# Patient Record
Sex: FEMALE | Race: WHITE | NOT HISPANIC OR LATINO | Employment: OTHER | URBAN - METROPOLITAN AREA
[De-identification: names, ages, dates, MRNs, and addresses within clinical notes are randomized per-mention and may not be internally consistent; named-entity substitution may affect disease eponyms.]

---

## 2017-01-27 ENCOUNTER — TELEPHONE (OUTPATIENT)
Dept: INTERNAL MEDICINE | Facility: CLINIC | Age: 72
End: 2017-01-27

## 2017-01-27 DIAGNOSIS — E78.5 HYPERLIPIDEMIA, UNSPECIFIED HYPERLIPIDEMIA TYPE: ICD-10-CM

## 2017-01-27 DIAGNOSIS — M81.0 OSTEOPOROSIS: Primary | ICD-10-CM

## 2017-01-27 NOTE — TELEPHONE ENCOUNTER
----- Message from Zaida Nathan sent at 1/27/2017  1:18 PM CST -----  Contact: Self  Doctor appointment and lab have been scheduled.  Please link lab orders to the lab appointment.  Date of doctor appointment:  4/6  Physical or EP:  Epp  Date of lab appointment:  3/28  Comments:

## 2017-03-30 ENCOUNTER — LAB VISIT (OUTPATIENT)
Dept: LAB | Facility: HOSPITAL | Age: 72
End: 2017-03-30
Attending: INTERNAL MEDICINE
Payer: MEDICARE

## 2017-03-30 DIAGNOSIS — M81.0 OSTEOPOROSIS: ICD-10-CM

## 2017-03-30 DIAGNOSIS — E78.5 HYPERLIPIDEMIA, UNSPECIFIED HYPERLIPIDEMIA TYPE: ICD-10-CM

## 2017-03-30 LAB
ALBUMIN SERPL BCP-MCNC: 3.6 G/DL
ALP SERPL-CCNC: 76 U/L
ALT SERPL W/O P-5'-P-CCNC: 18 U/L
ANION GAP SERPL CALC-SCNC: 7 MMOL/L
AST SERPL-CCNC: 22 U/L
BASOPHILS # BLD AUTO: 0.04 K/UL
BASOPHILS NFR BLD: 0.6 %
BILIRUB SERPL-MCNC: 0.6 MG/DL
BUN SERPL-MCNC: 13 MG/DL
CALCIUM SERPL-MCNC: 9.1 MG/DL
CHLORIDE SERPL-SCNC: 108 MMOL/L
CHOLEST/HDLC SERPL: 3 {RATIO}
CO2 SERPL-SCNC: 29 MMOL/L
CREAT SERPL-MCNC: 0.8 MG/DL
DIFFERENTIAL METHOD: ABNORMAL
EOSINOPHIL # BLD AUTO: 0.2 K/UL
EOSINOPHIL NFR BLD: 2.8 %
ERYTHROCYTE [DISTWIDTH] IN BLOOD BY AUTOMATED COUNT: 13.2 %
EST. GFR  (AFRICAN AMERICAN): >60 ML/MIN/1.73 M^2
EST. GFR  (NON AFRICAN AMERICAN): >60 ML/MIN/1.73 M^2
GLUCOSE SERPL-MCNC: 92 MG/DL
HCT VFR BLD AUTO: 44.1 %
HDL/CHOLESTEROL RATIO: 33.8 %
HDLC SERPL-MCNC: 198 MG/DL
HDLC SERPL-MCNC: 67 MG/DL
HGB BLD-MCNC: 14.8 G/DL
LDLC SERPL CALC-MCNC: 122.2 MG/DL
LYMPHOCYTES # BLD AUTO: 2.5 K/UL
LYMPHOCYTES NFR BLD: 36.6 %
MCH RBC QN AUTO: 31.1 PG
MCHC RBC AUTO-ENTMCNC: 33.6 %
MCV RBC AUTO: 93 FL
MONOCYTES # BLD AUTO: 0.4 K/UL
MONOCYTES NFR BLD: 6.4 %
NEUTROPHILS # BLD AUTO: 3.6 K/UL
NEUTROPHILS NFR BLD: 53.5 %
NONHDLC SERPL-MCNC: 131 MG/DL
PLATELET # BLD AUTO: 230 K/UL
PMV BLD AUTO: 11.5 FL
POTASSIUM SERPL-SCNC: 3.9 MMOL/L
PROT SERPL-MCNC: 6.7 G/DL
RBC # BLD AUTO: 4.76 M/UL
SODIUM SERPL-SCNC: 144 MMOL/L
TRIGL SERPL-MCNC: 44 MG/DL
TSH SERPL DL<=0.005 MIU/L-ACNC: 0.97 UIU/ML
WBC # BLD AUTO: 6.77 K/UL

## 2017-03-30 PROCEDURE — 84443 ASSAY THYROID STIM HORMONE: CPT

## 2017-03-30 PROCEDURE — 80061 LIPID PANEL: CPT

## 2017-03-30 PROCEDURE — 85025 COMPLETE CBC W/AUTO DIFF WBC: CPT

## 2017-03-30 PROCEDURE — 36415 COLL VENOUS BLD VENIPUNCTURE: CPT | Mod: PO

## 2017-03-30 PROCEDURE — 80053 COMPREHEN METABOLIC PANEL: CPT

## 2017-04-06 ENCOUNTER — OFFICE VISIT (OUTPATIENT)
Dept: INTERNAL MEDICINE | Facility: CLINIC | Age: 72
End: 2017-04-06
Payer: MEDICARE

## 2017-04-06 VITALS
DIASTOLIC BLOOD PRESSURE: 72 MMHG | HEIGHT: 64 IN | HEART RATE: 76 BPM | SYSTOLIC BLOOD PRESSURE: 110 MMHG | TEMPERATURE: 98 F | BODY MASS INDEX: 21.38 KG/M2 | WEIGHT: 125.25 LBS | RESPIRATION RATE: 16 BRPM

## 2017-04-06 DIAGNOSIS — R82.90 ABNORMAL URINE: ICD-10-CM

## 2017-04-06 DIAGNOSIS — Z00.00 ANNUAL PHYSICAL EXAM: Primary | ICD-10-CM

## 2017-04-06 DIAGNOSIS — M85.89 OSTEOPENIA OF MULTIPLE SITES: ICD-10-CM

## 2017-04-06 DIAGNOSIS — R25.2 MUSCLE CRAMPS: ICD-10-CM

## 2017-04-06 DIAGNOSIS — Z12.31 VISIT FOR SCREENING MAMMOGRAM: ICD-10-CM

## 2017-04-06 LAB
AMORPH CRY UR QL COMP ASSIST: ABNORMAL
BACTERIA #/AREA URNS AUTO: ABNORMAL /HPF
BILIRUB UR QL STRIP: NEGATIVE
CLARITY UR REFRACT.AUTO: ABNORMAL
COLOR UR AUTO: YELLOW
GLUCOSE UR QL STRIP: NEGATIVE
HGB UR QL STRIP: NEGATIVE
KETONES UR QL STRIP: NEGATIVE
LEUKOCYTE ESTERASE UR QL STRIP: NEGATIVE
MICROSCOPIC COMMENT: ABNORMAL
NITRITE UR QL STRIP: NEGATIVE
PH UR STRIP: 8 [PH] (ref 5–8)
PROT UR QL STRIP: NEGATIVE
SP GR UR STRIP: 1.01 (ref 1–1.03)
SQUAMOUS #/AREA URNS AUTO: 0 /HPF
URN SPEC COLLECT METH UR: ABNORMAL
UROBILINOGEN UR STRIP-ACNC: NEGATIVE EU/DL
WBC #/AREA URNS AUTO: 2 /HPF (ref 0–5)

## 2017-04-06 PROCEDURE — 99999 PR PBB SHADOW E&M-EST. PATIENT-LVL IV: CPT | Mod: PBBFAC,,, | Performed by: INTERNAL MEDICINE

## 2017-04-06 PROCEDURE — 81001 URINALYSIS AUTO W/SCOPE: CPT

## 2017-04-06 PROCEDURE — 99214 OFFICE O/P EST MOD 30 MIN: CPT | Mod: S$PBB,,, | Performed by: INTERNAL MEDICINE

## 2017-04-06 PROCEDURE — 99214 OFFICE O/P EST MOD 30 MIN: CPT | Mod: PBBFAC,PO | Performed by: INTERNAL MEDICINE

## 2017-04-06 PROCEDURE — 87086 URINE CULTURE/COLONY COUNT: CPT

## 2017-04-06 RX ORDER — RISEDRONATE SODIUM 150 MG/1
150 TABLET, FILM COATED ORAL
Qty: 3 TABLET | Refills: 3 | Status: SHIPPED | OUTPATIENT
Start: 2017-04-06 | End: 2017-04-07

## 2017-04-06 NOTE — MR AVS SNAPSHOT
[place] - [facility] Medicine   [facility]  [place] [address]  Phone: [phone]  Fax: [phone]                  Trista Lozano   2017 9:00 AM   Office Visit    Description:  Female : 1945   Provider:  Gabriela Gonzales DO   Department:  [place] [facility]           Reason for Visit     Annual Exam           Diagnoses this Visit        Comments    Annual physical exam    -  Primary     Osteopenia of multiple sites         Visit for screening mammogram         Abnormal urine         Muscle cramps                To Do List           Future Appointments        Provider Department Dept Phone    4/10/2017 9:40 AM [facility] [phone]      Goals (5 Years of Data)     None      Follow-Up and Disposition     Return in about 1 year (around 2018).       These Medications        Disp Refills Start End    risedronate (ACTONEL) 150 MG Tab 3 tablet 3 2017    Take 1 tablet (150 mg total) by mouth every 30 days. - Oral    Pharmacy: [facility]/pharmacy #[id number] - [place] - [address] Ph #: [phone]       zoster vaccine live, PF, (ZOSTAVAX) 19,400 unit/0.65 mL injection 1 vial 0 2017    Inject 19,400 Units into the skin once. Zostavax x 1  v70.0 - Subcutaneous    Pharmacy: [facility]/pharmacy #[id number] - [place] - [address] Ph #: [phone]         [facility]     [facility] Care Line -  Assistance  Unless otherwise directed by your provider, please contact [facility], our nurse care line that is available for  assistance.     Registered nurses in the [facility] provide: appointment scheduling, clinical advisement, health education, and other advisory services.  Call: [phone] (toll free)               Medications           Message regarding Medications     Verify the changes and/or additions to your medication regime listed below are the same as  "discussed with your clinician today.  If any of these changes or additions are incorrect, please notify your healthcare provider.        START taking these NEW medications        Refills    risedronate (ACTONEL) 150 MG Tab 3    Sig: Take 1 tablet (150 mg total) by mouth every 30 days.    Class: Normal    Route: Oral    zoster vaccine live, PF, (ZOSTAVAX) 19,400 unit/0.65 mL injection 0    Sig: Inject 19,400 Units into the skin once. Zostavax x 1  v70.0    Class: Print    Route: Subcutaneous      STOP taking these medications     alendronate (FOSAMAX) 70 MG tablet Take 1 tablet once weekly in the morning with a full glass of water on an empty stomach. Do not consume food or lie down for at least 30 minutes afters.           Verify that the below list of medications is an accurate representation of the medications you are currently taking.  If none reported, the list may be blank. If incorrect, please contact your healthcare provider. Carry this list with you in case of emergency.           Current Medications     FLUVIRIN [id number] 45 mcg (15 mcg x 3)/0.5 mL Susp     risedronate (ACTONEL) 150 MG Tab Take 1 tablet (150 mg total) by mouth every 30 days.    zoster vaccine live, PF, (ZOSTAVAX) 19,400 unit/0.65 mL injection Inject 19,400 Units into the skin once. Zostavax x 1  v70.0           Clinical Reference Information           Your Vitals Were     BP Pulse Temp Resp Height Weight    110/72 (BP Location: Left arm, Patient Position: Sitting, BP Method: Manual) 76 98.2 °F (36.8 °C) (Oral) 16 5' 4" (1.626 m) 56.8 kg (125 lb 3.5 oz)    BMI                21.49 kg/m2          Blood Pressure          Most Recent Value    BP  110/72      Allergies as of 4/6/2017     No Known Allergies      Immunizations Administered on Date of Encounter - 4/6/2017     None      Orders Placed During Today's Visit      Normal Orders This Visit    Urinalysis     Urine culture     Future Labs/Procedures Expected by Expires    Mammo Digital " Screening Bilat with CAD  4/6/2017 6/6/2018      [facility] Sign-Up     Activating your [facility] account is as easy as 1-2-3!     1) Visit [email or web address], select Sign Up Now, enter this activation code and your date of birth, then select Next.  Activation code not generated  Current Patient Portal Status: Account disabled      2) Create a username and password to use when you visit [facility] in the future and select a security question in case you lose your password and select Next.    3) Enter your e-mail address and click Sign Up!    Additional Information  If you have questions, please e-mail [email or web address] or call [phone] to talk to our [facility] staff. Remember, [facility] is NOT to be used for urgent needs. For medical emergencies, dial 911.         Language Assistance Services     ATTENTION: Language assistance services are available, free of charge. Please call [phone].      ATENCIÓN: Si habla lashay, tiene a pritchard disposición servicios gratuitos de asistencia lingüística. Llame al [phone].     CHÚ Ý: N?u b?n nói Ti?ng Vi?t, có các d?ch v? h? tr? ngôn ng? mi?n phí dành cho b?n. G?i s? [phone].         [place] [facility] complies with applicable Federal civil rights laws and does not discriminate on the basis of race, color, national origin, age, disability, or sex.

## 2017-04-06 NOTE — MEDICAL/APP STUDENT
Subjective:       Patient ID: Trista Lozano is a 71 y.o. female.    Chief Complaint: Annual Exam  Ms. Lozano 71 y.o. female here for annual exam. She has 3 concerns. She has noticed recent leg cramps that occur at night when she stretches, she mostly is able to massage them out but occasionally has to walk to relieve them. She admits to not being well hydrated. She has recently noticed a burning sensation in her chest. She is unable to identified any causes, foods, beverages, times of day that would produce this and suspects it may be heartburn. She is on alendronate, but would like to something once monthly that doesn't disrupt her routine as frequently.     Cholesterol: Total Cholesterol, TGs, HDL, and LDL WNL  Vaccines: Influenza; Tetanus Due 2025; Pneumovax 2014; [id number]; Zoster Due  Sexual Screening: Active   STD screening: No concerns for STDs  Eye exam: up to date  Mammogram: Due  Gyn exam: Follows with Dr. Menon  Colonoscopy: Due 2021  DEXA: Due 2018  A1c: Normal     Exercise: Active  Diet: Does not watch diet    Risk factors    Past Medical History:   Diagnosis Date    Menarche     Age of onset 12     Past Surgical History:   Procedure Laterality Date    COLONOSCOPY N/A 4/20/2016    Procedure: COLONOSCOPY;  Surgeon: Ellis Fry MD;  Location: [address];  Service: Endoscopy;  Laterality: N/A;  Pt request Prepopik/covered by insurance/Moviprep is not    NO PAST SURGERIES       Social History     Social History    Marital status:      Spouse name: N/A    Number of children: N/A    Years of education: N/A     Occupational History    Not on file.     Social History Main Topics    Smoking status: Never Smoker    Smokeless tobacco: Never Used    Alcohol use Yes      Comment: Socially/ few drinks (2-3) every week    Drug use: No    Sexual activity: Yes     Birth control/ protection: Post-menopausal     Other Topics Concern    Not on file     Social History Narrative     Review  of patient's allergies indicates:  No Known Allergies  Ms. Lozano had no medications administered during this visit.      HPI  Review of Systems   Constitutional: Negative for chills, fatigue and unexpected weight change.   HENT: Positive for postnasal drip. Negative for congestion and sore throat.    Eyes: Negative for visual disturbance.   Respiratory: Negative for cough, shortness of breath and wheezing.    Cardiovascular: Negative for chest pain, palpitations and leg swelling.   Gastrointestinal: Negative for abdominal pain, constipation, diarrhea and nausea.   Endocrine: Negative for cold intolerance and polyuria.   Genitourinary: Negative for dysuria, frequency, hematuria and urgency.   Musculoskeletal: Positive for myalgias. Negative for arthralgias.   Skin: Negative for rash.   Neurological: Negative for dizziness, syncope, weakness and numbness.   Hematological: Negative for adenopathy.       Objective:      Physical Exam   Constitutional: She is oriented to person, place, and time. She appears well-developed and well-nourished.   HENT:   Head: Normocephalic and atraumatic.   Eyes: Pupils are equal, round, and reactive to light.   Neck: Normal range of motion. Neck supple. No thyromegaly present.   Cardiovascular: Normal rate, regular rhythm, normal heart sounds and intact distal pulses.    Pulmonary/Chest: Effort normal and breath sounds normal.   Abdominal: Soft. Bowel sounds are normal. She exhibits no distension. There is no tenderness.   Musculoskeletal: She exhibits no edema.   Neurological: She is alert and oriented to person, place, and time.   Skin: Skin is warm and dry.   Psychiatric: She has a normal mood and affect.       Assessment:       1. Annual physical exam    2. Osteopenia of multiple sites    3. Visit for screening mammogram    4. Abnormal urine        Plan:       1. Annual  -Reviewed labs  -Vaccines--> prescription for Shingles  -Mammogram to be Scheduled (Due)    2. Osteopenia  -Switched  to Actonel once monthly  -Dexa next year    4. Abnormal Urine  -UA  And Cx       Austyn Valenzuela    [facility]

## 2017-04-06 NOTE — PROGRESS NOTES
Subjective:       Patient ID: Trista Lozano is a 71 y.o. female.    Chief Complaint: Annual Exam    Patient is a 71 y.o.female who presents today for annual      Cholesterol: (reviewed)  Vaccines: Influenza (yearly); Tetanus (2015) ; Pneumovax (2014); Zoster (declines)  Eye exam: up to date  Mammogram: due now  Gyn exam: dr. wilhelm  Colonoscopy: 2016; repeat in five years; doesn't want to do it again  DEXA: 2016  Exercise: active  Diet: healthy     1. Muscle cramps in the middle of the night.  2. Heartburn: intermittent and rare  3. Fosamax: wants to take something that is once a month    Review of Systems   Constitutional: Negative for appetite change, chills, diaphoresis, fatigue and fever.   HENT: Negative for congestion, dental problem, ear discharge, ear pain, hearing loss, postnasal drip, sinus pressure and sore throat.    Eyes: Negative for discharge, redness and itching.   Respiratory: Negative for cough, chest tightness, shortness of breath and wheezing.    Cardiovascular: Negative for chest pain, palpitations and leg swelling.   Gastrointestinal: Negative for abdominal pain, constipation, diarrhea, nausea and vomiting.   Endocrine: Negative for cold intolerance and heat intolerance.   Genitourinary: Negative for difficulty urinating, frequency, hematuria and urgency.   Musculoskeletal: Negative for arthralgias, back pain, gait problem, myalgias and neck pain.   Skin: Negative for color change and rash.   Neurological: Negative for dizziness, syncope and headaches.   Hematological: Negative for adenopathy.   Psychiatric/Behavioral: Negative for behavioral problems and sleep disturbance. The patient is not nervous/anxious.        Objective:      Physical Exam   Constitutional: She is oriented to person, place, and time. She appears well-developed and well-nourished. No distress.   HENT:   Head: Normocephalic and atraumatic.   Right Ear: External ear normal.   Left Ear: External ear normal.   Nose: Nose normal.    Mouth/Throat: Oropharynx is clear and moist. No oropharyngeal exudate.   Eyes: Conjunctivae and EOM are normal. Pupils are equal, round, and reactive to light. Right eye exhibits no discharge. Left eye exhibits no discharge. No scleral icterus.   Neck: Normal range of motion. Neck supple. No JVD present. No thyromegaly present.   Cardiovascular: Normal rate, regular rhythm, normal heart sounds and intact distal pulses.  Exam reveals no gallop and no friction rub.    No murmur heard.  Pulmonary/Chest: Effort normal and breath sounds normal. No stridor. No respiratory distress. She has no wheezes. She has no rales. She exhibits no tenderness.   Abdominal: Soft. Bowel sounds are normal. She exhibits no distension. There is no tenderness. There is no rebound.   Musculoskeletal: Normal range of motion. She exhibits no edema or tenderness.   Lymphadenopathy:     She has no cervical adenopathy.   Neurological: She is alert and oriented to person, place, and time.   Skin: Skin is warm. No rash noted. She is not diaphoretic. No erythema.   Psychiatric: She has a normal mood and affect. Her behavior is normal.   Nursing note and vitals reviewed.      Assessment and Plan:       1. Annual physical exam  - labs reviewed  - vaccines: shingles printed  - mammo due now    2. Osteopenia of multiple sites  - switch to actonel    3. Visit for screening mammogram  - Mammo Digital Screening Bilat with CAD; Future    4. Abnormal urine  - Urinalysis  - Urine culture    5. Muscle cramps  - increase fluids; try mag supplement if no improvement        No Follow-up on file.

## 2017-04-07 ENCOUNTER — TELEPHONE (OUTPATIENT)
Dept: INTERNAL MEDICINE | Facility: CLINIC | Age: 72
End: 2017-04-07

## 2017-04-07 LAB — BACTERIA UR CULT: NO GROWTH

## 2017-04-07 RX ORDER — ALENDRONATE SODIUM 70 MG/1
TABLET ORAL
Qty: 4 TABLET | Refills: 11 | Status: SHIPPED | OUTPATIENT
Start: 2017-04-07 | End: 2017-05-10 | Stop reason: SDUPTHER

## 2017-04-07 NOTE — TELEPHONE ENCOUNTER
Spoke to pt and informed of urine results.     Pt stated that actonel isn't covered by insurance company. $610 for 3 month supply. Pt is requesting to go back on the fosamax. Please send to pharm.

## 2017-04-10 ENCOUNTER — HOSPITAL ENCOUNTER (OUTPATIENT)
Dept: RADIOLOGY | Facility: HOSPITAL | Age: 72
Discharge: HOME | End: 2017-04-10
Attending: INTERNAL MEDICINE
Payer: MEDICARE

## 2017-04-10 DIAGNOSIS — Z12.31 VISIT FOR SCREENING MAMMOGRAM: ICD-10-CM

## 2017-04-10 PROCEDURE — 77067 SCR MAMMO BI INCL CAD: CPT | Mod: 26,,, | Performed by: RADIOLOGY

## 2017-04-10 PROCEDURE — 77067 SCR MAMMO BI INCL CAD: CPT | Mod: TC

## 2017-04-10 PROCEDURE — 77063 BREAST TOMOSYNTHESIS BI: CPT | Mod: 26,,, | Performed by: RADIOLOGY

## 2017-05-10 RX ORDER — ALENDRONATE SODIUM 70 MG/1
TABLET ORAL
Qty: 12 TABLET | Refills: 1 | Status: SHIPPED | OUTPATIENT
Start: 2017-05-10 | End: 2018-08-23 | Stop reason: SDUPTHER

## 2017-09-20 ENCOUNTER — TELEPHONE (OUTPATIENT)
Dept: OBSTETRICS AND GYNECOLOGY | Facility: CLINIC | Age: 72
End: 2017-09-20

## 2017-10-13 ENCOUNTER — OFFICE VISIT (OUTPATIENT)
Dept: OBSTETRICS AND GYNECOLOGY | Facility: CLINIC | Age: 72
End: 2017-10-13
Payer: MEDICARE

## 2017-10-13 VITALS
WEIGHT: 125 LBS | HEIGHT: 64 IN | BODY MASS INDEX: 21.34 KG/M2 | DIASTOLIC BLOOD PRESSURE: 74 MMHG | SYSTOLIC BLOOD PRESSURE: 124 MMHG

## 2017-10-13 DIAGNOSIS — Z01.419 ENCOUNTER FOR GYNECOLOGICAL EXAMINATION WITHOUT ABNORMAL FINDING: Primary | ICD-10-CM

## 2017-10-13 DIAGNOSIS — Z78.0 POSTMENOPAUSAL: ICD-10-CM

## 2017-10-13 PROCEDURE — 99999 PR PBB SHADOW E&M-EST. PATIENT-LVL III: CPT | Mod: PBBFAC,,, | Performed by: OBSTETRICS & GYNECOLOGY

## 2017-10-13 PROCEDURE — G0101 CA SCREEN;PELVIC/BREAST EXAM: HCPCS | Mod: S$PBB,,, | Performed by: OBSTETRICS & GYNECOLOGY

## 2017-10-13 PROCEDURE — 99213 OFFICE O/P EST LOW 20 MIN: CPT | Mod: PBBFAC,PN | Performed by: OBSTETRICS & GYNECOLOGY

## 2017-10-13 PROCEDURE — G0101 CA SCREEN;PELVIC/BREAST EXAM: HCPCS | Mod: PBBFAC,PN | Performed by: OBSTETRICS & GYNECOLOGY

## 2017-10-13 NOTE — PROGRESS NOTES
"CC: Well woman exam    Trista Lozano is a 72 y.o. female  presents for a well woman exam.  LMP: No LMP recorded. Patient is postmenopausal..  No issues, problems, or complaints.    Past Medical History:   Diagnosis Date    Menarche     Age of onset 12     Past Surgical History:   Procedure Laterality Date    COLONOSCOPY N/A 2016    Procedure: COLONOSCOPY;  Surgeon: Ellis Fry MD;  Location: [address]);  Service: Endoscopy;  Laterality: N/A;  Pt request Prepopik/covered by insurance/Moviprep is not    NO PAST SURGERIES       Social History     Social History    Marital status:      Spouse name: N/A    Number of children: N/A    Years of education: N/A     Social History Main Topics    Smoking status: Never Smoker    Smokeless tobacco: Never Used    Alcohol use Yes      Comment: Socially/ few drinks (2-3) every week    Drug use: No    Sexual activity: Yes     Birth control/ protection: Post-menopausal     Other Topics Concern    None     Social History Narrative    None     Family History   Problem Relation Age of Onset    Breast cancer Neg Hx     Colon cancer Neg Hx     Ovarian cancer Neg Hx     Hypertension Neg Hx     Diabetes Neg Hx      OB History      Para Term  AB Living    3 3            SAB TAB Ectopic Multiple Live Births                       /74   Ht 5' 4" (1.626 m)   Wt 56.7 kg (125 lb)   BMI 21.46 kg/m²       ROS:    ROS:  GENERAL: Denies weight gain or weight loss. Feeling well overall.   SKIN: Denies rash or lesions.   HEAD: Denies head injury or headache.   NODES: Denies enlarged lymph nodes.   CHEST: Denies chest pain or shortness of breath.   CARDIOVASCULAR: Denies palpitations or left sided chest pain.   ABDOMEN: No abdominal pain, constipation, diarrhea, nausea, vomiting or rectal bleeding.   URINARY: No frequency, dysuria, hematuria, or burning on urination.  REPRODUCTIVE: See HPI.   BREASTS: The patient performs breast " self-examination and denies pain, lumps, or nipple discharge.   HEMATOLOGIC: No easy bruisability or excessive bleeding.   MUSCULOSKELETAL: Denies joint pain or swelling.   NEUROLOGIC: Denies syncope or weakness.   PSYCHIATRIC: Denies depression, anxiety or mood swings.    PHYSICAL EXAM:    APPEARANCE: Well nourished, well developed, in no acute distress.  AFFECT: WNL, alert and oriented x 3  SKIN: No acne or hirsutism  NECK: Neck symmetric without masses or thyromegaly  NODES: No inguinal, cervical, axillary, or femoral lymph node enlargement  CHEST: Good respiratory effect  ABDOMEN: Soft.  No tenderness or masses.  No hepatosplenomegaly.  No hernias.  BREASTS: Symmetrical, no skin changes or visible lesions.  No palpable masses, nipple discharge bilaterally.  PELVIC: Normal external genitalia without lesions.  Normal hair distribution.  Adequate perineal body, normal urethral meatus.  Vagina moist and well rugated without lesions or discharge.  Cervix pink, without lesions, discharge or tenderness.  No significant cystocele or rectocele.  Bimanual exam shows uterus to be normal size, regular, mobile and nontender.  Adnexa without masses or tenderness.    RECTAL: Rectovaginal exam confirms above with normal sphincter tone, no masses.  EXTREMITIES: No edema.      ICD-10-CM ICD-9-CM    1. Encounter for gynecological examination without abnormal finding Z01.419 V72.31    2. Postmenopausal Z78.0 V49.81          Patient was counseled today on A.C.S. Pap guidelines and recommendations for yearly pelvic exams, mammograms and monthly self breast exams; to see her PCP for other health maintenance.   F/U PRN

## 2017-10-18 ENCOUNTER — TELEPHONE (OUTPATIENT)
Dept: INTERNAL MEDICINE | Facility: CLINIC | Age: 72
End: 2017-10-18

## 2017-10-18 NOTE — TELEPHONE ENCOUNTER
----- Message from Dodie Milan sent at 10/18/2017 12:19 PM CDT -----  Contact: Self/ [phone]   Pt is calling to see when can she drop off the paperwork so she can have eye surgery. Pt stated she need to have it fill out before she can schedule the surgery. Please call and advise     Thank you

## 2017-11-02 ENCOUNTER — LAB VISIT (OUTPATIENT)
Dept: LAB | Facility: HOSPITAL | Age: 72
End: 2017-11-02
Attending: INTERNAL MEDICINE
Payer: MEDICARE

## 2017-11-02 ENCOUNTER — OFFICE VISIT (OUTPATIENT)
Dept: INTERNAL MEDICINE | Facility: CLINIC | Age: 72
End: 2017-11-02
Payer: MEDICARE

## 2017-11-02 VITALS
WEIGHT: 126.56 LBS | HEART RATE: 97 BPM | DIASTOLIC BLOOD PRESSURE: 81 MMHG | HEIGHT: 64 IN | TEMPERATURE: 98 F | BODY MASS INDEX: 21.61 KG/M2 | SYSTOLIC BLOOD PRESSURE: 121 MMHG | RESPIRATION RATE: 16 BRPM

## 2017-11-02 DIAGNOSIS — Z01.818 PRE-OP EVALUATION: Primary | ICD-10-CM

## 2017-11-02 DIAGNOSIS — Z01.818 PRE-OP EVALUATION: ICD-10-CM

## 2017-11-02 LAB
ALBUMIN SERPL BCP-MCNC: 3.7 G/DL
ALP SERPL-CCNC: 77 U/L
ALT SERPL W/O P-5'-P-CCNC: 20 U/L
ANION GAP SERPL CALC-SCNC: 9 MMOL/L
AST SERPL-CCNC: 25 U/L
BASOPHILS # BLD AUTO: 0.07 K/UL
BASOPHILS NFR BLD: 1 %
BILIRUB SERPL-MCNC: 0.4 MG/DL
BUN SERPL-MCNC: 11 MG/DL
CALCIUM SERPL-MCNC: 9.6 MG/DL
CHLORIDE SERPL-SCNC: 105 MMOL/L
CO2 SERPL-SCNC: 29 MMOL/L
CREAT SERPL-MCNC: 0.9 MG/DL
DIFFERENTIAL METHOD: ABNORMAL
EOSINOPHIL # BLD AUTO: 0.1 K/UL
EOSINOPHIL NFR BLD: 1.6 %
ERYTHROCYTE [DISTWIDTH] IN BLOOD BY AUTOMATED COUNT: 12.7 %
EST. GFR  (AFRICAN AMERICAN): >60 ML/MIN/1.73 M^2
EST. GFR  (NON AFRICAN AMERICAN): >60 ML/MIN/1.73 M^2
GLUCOSE SERPL-MCNC: 111 MG/DL
HCT VFR BLD AUTO: 42.4 %
HGB BLD-MCNC: 14.4 G/DL
IMM GRANULOCYTES # BLD AUTO: 0.01 K/UL
IMM GRANULOCYTES NFR BLD AUTO: 0.1 %
LYMPHOCYTES # BLD AUTO: 2.7 K/UL
LYMPHOCYTES NFR BLD: 38.7 %
MCH RBC QN AUTO: 31.6 PG
MCHC RBC AUTO-ENTMCNC: 34 G/DL
MCV RBC AUTO: 93 FL
MONOCYTES # BLD AUTO: 0.5 K/UL
MONOCYTES NFR BLD: 7.4 %
NEUTROPHILS # BLD AUTO: 3.6 K/UL
NEUTROPHILS NFR BLD: 51.2 %
NRBC BLD-RTO: 0 /100 WBC
PLATELET # BLD AUTO: 212 K/UL
PMV BLD AUTO: 11.7 FL
POTASSIUM SERPL-SCNC: 4 MMOL/L
PROT SERPL-MCNC: 7 G/DL
RBC # BLD AUTO: 4.55 M/UL
SODIUM SERPL-SCNC: 143 MMOL/L
WBC # BLD AUTO: 7.01 K/UL

## 2017-11-02 PROCEDURE — 93005 ELECTROCARDIOGRAM TRACING: CPT | Mod: PBBFAC,PO | Performed by: INTERNAL MEDICINE

## 2017-11-02 PROCEDURE — 99213 OFFICE O/P EST LOW 20 MIN: CPT | Mod: PBBFAC,PO | Performed by: INTERNAL MEDICINE

## 2017-11-02 PROCEDURE — 80053 COMPREHEN METABOLIC PANEL: CPT

## 2017-11-02 PROCEDURE — 99999 PR PBB SHADOW E&M-EST. PATIENT-LVL III: CPT | Mod: PBBFAC,,, | Performed by: INTERNAL MEDICINE

## 2017-11-02 PROCEDURE — 99214 OFFICE O/P EST MOD 30 MIN: CPT | Mod: S$PBB,,, | Performed by: INTERNAL MEDICINE

## 2017-11-02 PROCEDURE — 36415 COLL VENOUS BLD VENIPUNCTURE: CPT | Mod: PO

## 2017-11-02 PROCEDURE — 93010 ELECTROCARDIOGRAM REPORT: CPT | Mod: ,,, | Performed by: INTERNAL MEDICINE

## 2017-11-02 PROCEDURE — 85025 COMPLETE CBC W/AUTO DIFF WBC: CPT

## 2017-11-02 RX ORDER — DUREZOL 0.5 MG/ML
EMULSION OPHTHALMIC
Refills: 2 | COMMUNITY
Start: 2017-10-13 | End: 2018-08-23 | Stop reason: ALTCHOICE

## 2017-11-02 RX ORDER — CIPROFLOXACIN HYDROCHLORIDE 3 MG/ML
SOLUTION/ DROPS OPHTHALMIC
Refills: 1 | COMMUNITY
Start: 2017-10-10 | End: 2018-08-23 | Stop reason: ALTCHOICE

## 2017-11-02 RX ORDER — NEPAFENAC 3 MG/ML
SUSPENSION/ DROPS OPHTHALMIC
Refills: 2 | COMMUNITY
Start: 2017-10-13 | End: 2018-08-23 | Stop reason: ALTCHOICE

## 2017-11-02 NOTE — PROGRESS NOTES
Subjective:       Patient ID: Trista Lozano is a 72 y.o. female.    Chief Complaint: Pre-op Exam    Patient is a 72 y.o.female who presents today for preop. She is scheduled for cataract surgery with dr. Calvo on Monday.      Denies CP/SOB/nausea/MI last 3 months, denies heart failure, denies arrhythmias, denies valvular heart disease.    RCRI criteria: denies IDDM, denies CAD, denies CVA, denies chronic renal insufficiency, denies heart failure, denies high risk surgery    Functional status: good    Bleeding risk - history of bleeding with prior surgeries - denies, family history of bleeding disorders - denies    History of prior anesthetic complications good          Review of Systems   Constitutional: Negative for appetite change, chills, diaphoresis, fatigue and fever.   HENT: Negative for congestion, dental problem, ear discharge, ear pain, hearing loss, postnasal drip, sinus pressure and sore throat.    Eyes: Negative for discharge, redness and itching.   Respiratory: Negative for cough, chest tightness, shortness of breath and wheezing.    Cardiovascular: Negative for chest pain, palpitations and leg swelling.   Gastrointestinal: Negative for abdominal pain, constipation, diarrhea, nausea and vomiting.   Endocrine: Negative for cold intolerance and heat intolerance.   Genitourinary: Negative for difficulty urinating, frequency, hematuria and urgency.   Musculoskeletal: Negative for arthralgias, back pain, gait problem, myalgias and neck pain.   Skin: Negative for color change and rash.   Neurological: Negative for dizziness, syncope and headaches.   Hematological: Negative for adenopathy.   Psychiatric/Behavioral: Negative for behavioral problems and sleep disturbance. The patient is not nervous/anxious.        Objective:      Physical Exam   Constitutional: She is oriented to person, place, and time. She appears well-developed and well-nourished. No distress.   HENT:   Head: Normocephalic and atraumatic.    Right Ear: External ear normal.   Left Ear: External ear normal.   Nose: Nose normal.   Mouth/Throat: Oropharynx is clear and moist. No oropharyngeal exudate.   Eyes: Conjunctivae and EOM are normal. Pupils are equal, round, and reactive to light. Right eye exhibits no discharge. Left eye exhibits no discharge. No scleral icterus.   Neck: Normal range of motion. Neck supple. No JVD present. No thyromegaly present.   Cardiovascular: Normal rate, regular rhythm, normal heart sounds and intact distal pulses.  Exam reveals no gallop and no friction rub.    No murmur heard.  Pulmonary/Chest: Effort normal and breath sounds normal. No stridor. No respiratory distress. She has no wheezes. She has no rales. She exhibits no tenderness.   Abdominal: Soft. Bowel sounds are normal. She exhibits no distension. There is no tenderness. There is no rebound.   Musculoskeletal: Normal range of motion. She exhibits no edema or tenderness.   Lymphadenopathy:     She has no cervical adenopathy.   Neurological: She is alert and oriented to person, place, and time. No cranial nerve deficit.   Skin: Skin is warm and dry. No rash noted. She is not diaphoretic. No erythema.   Psychiatric: She has a normal mood and affect. Her behavior is normal.   Nursing note and vitals reviewed.      Assessment and Plan:       1. Pre-op evaluation  - CBC auto differential; Future  - Comprehensive metabolic panel; Future  - EKG 12-lead  - labs and ekg are stable  - No contraindications to anesthesia or surgery at this time.          No Follow-up on file.

## 2017-11-03 ENCOUNTER — TELEPHONE (OUTPATIENT)
Dept: INTERNAL MEDICINE | Facility: CLINIC | Age: 72
End: 2017-11-03

## 2017-11-03 NOTE — TELEPHONE ENCOUNTER
----- Message from Melva Padgett sent at 11/3/2017  9:19 AM CDT -----  Contact: Self [phone]  Pt requesting a call back in regards to fax being sent to eye doctor. Pt stated very important is done to day she has surgery on Monday. Please advise.

## 2017-11-03 NOTE — TELEPHONE ENCOUNTER
Notify pt: labs are all normal.  Please fax her note, labs and ekg to her surgeon. Her surgery is on monday

## 2017-12-04 ENCOUNTER — TELEPHONE (OUTPATIENT)
Dept: INTERNAL MEDICINE | Facility: CLINIC | Age: 72
End: 2017-12-04

## 2017-12-04 NOTE — TELEPHONE ENCOUNTER
----- Message from Valencia Johnson sent at 12/4/2017  1:19 PM CST -----  Contact: Pt [phone] or [phone]  Pt needs a form filled out for her second eye surgery. She saw Dr Gonzales on 11/2 for Pre Op for the first eye and is wondering if Dr Gonzales could fill the second one out without behind see. Pt would like a call back from the nurse.

## 2017-12-04 NOTE — TELEPHONE ENCOUNTER
Spoke to patient and  Told her that doctor was out  And we would get back with her on her when doctor return

## 2017-12-06 ENCOUNTER — TELEPHONE (OUTPATIENT)
Dept: INTERNAL MEDICINE | Facility: CLINIC | Age: 72
End: 2017-12-06

## 2017-12-06 NOTE — TELEPHONE ENCOUNTER
----- Message from Laquita Castillo sent at 12/6/2017 10:46 AM CST -----  Contact: self/ [phone]  Patient would like to know if she need another pre-op or can she drop the form off for her eye surgery dated for 12/18/17

## 2017-12-06 NOTE — TELEPHONE ENCOUNTER
"Faxed from to dr zamorano's office (copy in "faxes" b ox)    Left msg at patient cell form being faxed this afternoon to eye .     "

## 2017-12-06 NOTE — TELEPHONE ENCOUNTER
----- Message from Mariaelena Dove sent at 12/5/2017  4:51 PM CST -----  Contact: Self/[phone]  Having left eye cataract surgery 12/18. Would like to know if she has to schedule another pre-op surgery. Please advise.

## 2018-04-14 RX ORDER — ALENDRONATE SODIUM 70 MG/1
TABLET ORAL
Qty: 4 TABLET | Refills: 2 | Status: SHIPPED | OUTPATIENT
Start: 2018-04-14 | End: 2018-07-16 | Stop reason: SDUPTHER

## 2018-04-30 ENCOUNTER — TELEPHONE (OUTPATIENT)
Dept: INTERNAL MEDICINE | Facility: CLINIC | Age: 73
End: 2018-04-30

## 2018-04-30 DIAGNOSIS — E78.5 HYPERLIPIDEMIA, UNSPECIFIED HYPERLIPIDEMIA TYPE: ICD-10-CM

## 2018-04-30 DIAGNOSIS — Z12.31 VISIT FOR SCREENING MAMMOGRAM: ICD-10-CM

## 2018-04-30 DIAGNOSIS — E55.9 VITAMIN D DEFICIENCY: ICD-10-CM

## 2018-04-30 DIAGNOSIS — M81.0 OSTEOPOROSIS, UNSPECIFIED OSTEOPOROSIS TYPE, UNSPECIFIED PATHOLOGICAL FRACTURE PRESENCE: ICD-10-CM

## 2018-04-30 DIAGNOSIS — Z00.00 ANNUAL PHYSICAL EXAM: Primary | ICD-10-CM

## 2018-04-30 NOTE — TELEPHONE ENCOUNTER
----- Message from Ermias Villanueva sent at 4/30/2018 10:01 AM CDT -----  Contact: self   Doctor appointment and lab have been scheduled.  Please link lab orders to the lab appointment.  Date of doctor appointment:  08/23  Physical or EP:  Physical   Date of lab appointment:  08/16  Comments:     --------------------------------------------------------------------------------------------------------------------------  Patient would like to get a referral.  Does the patient already have the specialty clinic appointment scheduled:  No   If yes, what date is the appointment scheduled:no      Referral to what specialty:  Mammogram   Reason (be specific):  Yearly   Does the patient want the referral with a specific physician:  no  [facility]  Comments:

## 2018-07-16 RX ORDER — ALENDRONATE SODIUM 70 MG/1
TABLET ORAL
Qty: 4 TABLET | Refills: 2 | Status: SHIPPED | OUTPATIENT
Start: 2018-07-16 | End: 2018-10-14 | Stop reason: SDUPTHER

## 2018-08-16 ENCOUNTER — LAB VISIT (OUTPATIENT)
Dept: LAB | Facility: HOSPITAL | Age: 73
End: 2018-08-16
Attending: INTERNAL MEDICINE
Payer: MEDICARE

## 2018-08-16 DIAGNOSIS — Z00.00 ANNUAL PHYSICAL EXAM: ICD-10-CM

## 2018-08-16 DIAGNOSIS — E55.9 VITAMIN D DEFICIENCY: ICD-10-CM

## 2018-08-16 DIAGNOSIS — M81.0 OSTEOPOROSIS, UNSPECIFIED OSTEOPOROSIS TYPE, UNSPECIFIED PATHOLOGICAL FRACTURE PRESENCE: ICD-10-CM

## 2018-08-16 DIAGNOSIS — Z12.31 VISIT FOR SCREENING MAMMOGRAM: ICD-10-CM

## 2018-08-16 DIAGNOSIS — E78.5 HYPERLIPIDEMIA, UNSPECIFIED HYPERLIPIDEMIA TYPE: ICD-10-CM

## 2018-08-16 LAB
25(OH)D3+25(OH)D2 SERPL-MCNC: 43 NG/ML
ALBUMIN SERPL BCP-MCNC: 4 G/DL
ALP SERPL-CCNC: 74 U/L
ALT SERPL W/O P-5'-P-CCNC: 20 U/L
ANION GAP SERPL CALC-SCNC: 11 MMOL/L
AST SERPL-CCNC: 24 U/L
BASOPHILS # BLD AUTO: 0.07 K/UL
BASOPHILS NFR BLD: 0.7 %
BILIRUB SERPL-MCNC: 0.8 MG/DL
BUN SERPL-MCNC: 11 MG/DL
CALCIUM SERPL-MCNC: 9.4 MG/DL
CHLORIDE SERPL-SCNC: 106 MMOL/L
CHOLEST SERPL-MCNC: 209 MG/DL
CHOLEST/HDLC SERPL: 3 {RATIO}
CO2 SERPL-SCNC: 26 MMOL/L
CREAT SERPL-MCNC: 0.8 MG/DL
DIFFERENTIAL METHOD: ABNORMAL
EOSINOPHIL # BLD AUTO: 0.2 K/UL
EOSINOPHIL NFR BLD: 1.8 %
ERYTHROCYTE [DISTWIDTH] IN BLOOD BY AUTOMATED COUNT: 12.8 %
EST. GFR  (AFRICAN AMERICAN): >60 ML/MIN/1.73 M^2
EST. GFR  (NON AFRICAN AMERICAN): >60 ML/MIN/1.73 M^2
GLUCOSE SERPL-MCNC: 98 MG/DL
HCT VFR BLD AUTO: 46.1 %
HDLC SERPL-MCNC: 69 MG/DL
HDLC SERPL: 33 %
HGB BLD-MCNC: 15.3 G/DL
IMM GRANULOCYTES # BLD AUTO: 0.02 K/UL
IMM GRANULOCYTES NFR BLD AUTO: 0.2 %
LDLC SERPL CALC-MCNC: 128.6 MG/DL
LYMPHOCYTES # BLD AUTO: 2.5 K/UL
LYMPHOCYTES NFR BLD: 26.3 %
MCH RBC QN AUTO: 31.5 PG
MCHC RBC AUTO-ENTMCNC: 33.2 G/DL
MCV RBC AUTO: 95 FL
MONOCYTES # BLD AUTO: 0.6 K/UL
MONOCYTES NFR BLD: 6.5 %
NEUTROPHILS # BLD AUTO: 6.2 K/UL
NEUTROPHILS NFR BLD: 64.5 %
NONHDLC SERPL-MCNC: 140 MG/DL
NRBC BLD-RTO: 0 /100 WBC
PLATELET # BLD AUTO: 212 K/UL
PMV BLD AUTO: 11.9 FL
POTASSIUM SERPL-SCNC: 4.5 MMOL/L
PROT SERPL-MCNC: 7 G/DL
RBC # BLD AUTO: 4.85 M/UL
SODIUM SERPL-SCNC: 143 MMOL/L
TRIGL SERPL-MCNC: 57 MG/DL
TSH SERPL DL<=0.005 MIU/L-ACNC: 1.09 UIU/ML
WBC # BLD AUTO: 9.59 K/UL

## 2018-08-16 PROCEDURE — 80053 COMPREHEN METABOLIC PANEL: CPT

## 2018-08-16 PROCEDURE — 80061 LIPID PANEL: CPT

## 2018-08-16 PROCEDURE — 85025 COMPLETE CBC W/AUTO DIFF WBC: CPT

## 2018-08-16 PROCEDURE — 36415 COLL VENOUS BLD VENIPUNCTURE: CPT | Mod: PO

## 2018-08-16 PROCEDURE — 84443 ASSAY THYROID STIM HORMONE: CPT

## 2018-08-16 PROCEDURE — 82306 VITAMIN D 25 HYDROXY: CPT

## 2018-08-17 NOTE — PROGRESS NOTES
Subjective:       Patient ID: Trista Lozano is a 73 y.o. female.    Chief Complaint: Annual Exam    Patient is a 73 y.o.female who presents today for annual.    Cholesterol: (reviewed)  Vaccines: Influenza (yearly); Tetanus (2015) Pneumovax (2014); Zoster (declines)  Eye exam: up to date  Mammogram: due now; ordered  Gyn exam: dr. wilhelm  Colonoscopy: 2016; repeat in five years; doesn't want to do it again  DEXA: 2016  Exercise: active  Diet: healthy    Review of Systems   Constitutional: Negative for appetite change, chills, diaphoresis, fatigue and fever.   HENT: Negative for congestion, dental problem, ear discharge, ear pain, hearing loss, postnasal drip, sinus pressure and sore throat.    Eyes: Negative for discharge, redness and itching.   Respiratory: Negative for cough, chest tightness, shortness of breath and wheezing.    Cardiovascular: Negative for chest pain, palpitations and leg swelling.   Gastrointestinal: Negative for abdominal pain, constipation, diarrhea, nausea and vomiting.   Endocrine: Negative for cold intolerance and heat intolerance.   Genitourinary: Negative for difficulty urinating, frequency, hematuria and urgency.   Musculoskeletal: Negative for arthralgias, back pain, gait problem, myalgias and neck pain.   Skin: Negative for color change and rash.   Neurological: Negative for dizziness, syncope and headaches.   Hematological: Negative for adenopathy.   Psychiatric/Behavioral: Negative for behavioral problems and sleep disturbance. The patient is not nervous/anxious.        Objective:      Physical Exam   Constitutional: She is oriented to person, place, and time. She appears well-developed and well-nourished. No distress.   HENT:   Head: Normocephalic and atraumatic.   Right Ear: External ear normal.   Left Ear: External ear normal.   Nose: Nose normal.   Mouth/Throat: Oropharynx is clear and moist. No oropharyngeal exudate.   Eyes: Conjunctivae and EOM are normal. Pupils are equal, round,  and reactive to light. Right eye exhibits no discharge. Left eye exhibits no discharge. No scleral icterus.   Neck: Normal range of motion. Neck supple. No JVD present. No thyromegaly present.   Cardiovascular: Normal rate, regular rhythm, normal heart sounds and intact distal pulses. Exam reveals no gallop and no friction rub.   No murmur heard.  Pulmonary/Chest: Effort normal and breath sounds normal. No stridor. No respiratory distress. She has no wheezes. She has no rales. She exhibits no tenderness.   Abdominal: Soft. Bowel sounds are normal. She exhibits no distension. There is no tenderness. There is no rebound.   Musculoskeletal: Normal range of motion. She exhibits no edema or tenderness.   Lymphadenopathy:     She has no cervical adenopathy.   Neurological: She is alert and oriented to person, place, and time. No cranial nerve deficit.   Skin: Skin is warm and dry. No rash noted. She is not diaphoretic. No erythema.   Psychiatric: She has a normal mood and affect. Her behavior is normal.   Nursing note and vitals reviewed.      Assessment and Plan:       1. Annual physical exam  - labs reviewed    2. Osteopenia of multiple sites    - DXA Bone Density Spine And Hip; Future    3. Visit for screening mammogram    - Mammo Digital Screening Bilat with CAD; Future          No Follow-up on file.

## 2018-08-23 ENCOUNTER — OFFICE VISIT (OUTPATIENT)
Dept: INTERNAL MEDICINE | Facility: CLINIC | Age: 73
End: 2018-08-23
Payer: MEDICARE

## 2018-08-23 VITALS
TEMPERATURE: 98 F | HEIGHT: 64 IN | RESPIRATION RATE: 12 BRPM | WEIGHT: 126.56 LBS | BODY MASS INDEX: 21.61 KG/M2 | SYSTOLIC BLOOD PRESSURE: 120 MMHG | HEART RATE: 76 BPM | DIASTOLIC BLOOD PRESSURE: 64 MMHG

## 2018-08-23 DIAGNOSIS — Z12.31 VISIT FOR SCREENING MAMMOGRAM: ICD-10-CM

## 2018-08-23 DIAGNOSIS — M85.89 OSTEOPENIA OF MULTIPLE SITES: Primary | ICD-10-CM

## 2018-08-23 PROCEDURE — 99999 PR PBB SHADOW E&M-EST. PATIENT-LVL III: CPT | Mod: PBBFAC,,, | Performed by: INTERNAL MEDICINE

## 2018-08-23 PROCEDURE — 99397 PER PM REEVAL EST PAT 65+ YR: CPT | Mod: S$PBB,,, | Performed by: INTERNAL MEDICINE

## 2018-08-23 PROCEDURE — 99213 OFFICE O/P EST LOW 20 MIN: CPT | Mod: PBBFAC,PO | Performed by: INTERNAL MEDICINE

## 2018-08-23 RX ORDER — CIPROFLOXACIN 500 MG/1
500 TABLET ORAL 2 TIMES DAILY
Qty: 6 TABLET | Refills: 0 | Status: SHIPPED | OUTPATIENT
Start: 2018-08-23 | End: 2018-08-26

## 2018-09-06 ENCOUNTER — HOSPITAL ENCOUNTER (OUTPATIENT)
Dept: RADIOLOGY | Facility: HOSPITAL | Age: 73
Discharge: HOME | End: 2018-09-06
Attending: INTERNAL MEDICINE
Payer: MEDICARE

## 2018-09-06 DIAGNOSIS — Z12.31 VISIT FOR SCREENING MAMMOGRAM: ICD-10-CM

## 2018-09-06 PROCEDURE — 77067 SCR MAMMO BI INCL CAD: CPT | Mod: TC,PO

## 2018-09-06 PROCEDURE — 77067 SCR MAMMO BI INCL CAD: CPT | Mod: 26,,, | Performed by: RADIOLOGY

## 2018-09-06 PROCEDURE — 77063 BREAST TOMOSYNTHESIS BI: CPT | Mod: 26,,, | Performed by: RADIOLOGY

## 2018-09-07 ENCOUNTER — TELEPHONE (OUTPATIENT)
Dept: INTERNAL MEDICINE | Facility: CLINIC | Age: 73
End: 2018-09-07

## 2018-09-07 NOTE — TELEPHONE ENCOUNTER
Notify pt that dexa shows osteopenia. It has worsened since previous and therefore would advise she continue fosamax and calcium

## 2018-09-10 NOTE — TELEPHONE ENCOUNTER
----- Message from Zaida Nathan sent at 9/10/2018  1:17 PM CDT -----  Contact: Self. Call [phone]  Patient is returning a phone call.  Who left a message for the patient: Valencia  Does patient know what this is regarding:  BMD    Comments: She was out of town and couldn't get back with you.

## 2018-09-11 NOTE — TELEPHONE ENCOUNTER
----- Message from Zaida Nathan sent at 9/11/2018  9:03 AM CDT -----  Contact: Cell   [phone]  Patient left a return call message for Valencia to call her back on yesterday. Please call her on the cell number she has to run some errands.  [phone].

## 2018-09-26 ENCOUNTER — LAB VISIT (OUTPATIENT)
Dept: LAB | Facility: HOSPITAL | Age: 73
End: 2018-09-26
Attending: INTERNAL MEDICINE
Payer: MEDICARE

## 2018-09-26 ENCOUNTER — TELEPHONE (OUTPATIENT)
Dept: INTERNAL MEDICINE | Facility: CLINIC | Age: 73
End: 2018-09-26

## 2018-09-26 DIAGNOSIS — N39.0 URINARY TRACT INFECTION WITHOUT HEMATURIA, SITE UNSPECIFIED: Primary | ICD-10-CM

## 2018-09-26 DIAGNOSIS — N39.0 URINARY TRACT INFECTION WITHOUT HEMATURIA, SITE UNSPECIFIED: ICD-10-CM

## 2018-09-26 PROCEDURE — 87086 URINE CULTURE/COLONY COUNT: CPT

## 2018-09-26 RX ORDER — CIPROFLOXACIN 500 MG/1
500 TABLET ORAL 2 TIMES DAILY
Qty: 14 TABLET | Refills: 0 | Status: SHIPPED | OUTPATIENT
Start: 2018-09-26 | End: 2018-10-03

## 2018-09-26 NOTE — TELEPHONE ENCOUNTER
----- Message from Evelyn Lopez sent at 9/26/2018 12:53 PM CDT -----  Contact: Self [phone]  Pt will the doctor to call in a prescription for UTI and will like to speak to the nurse.

## 2018-09-26 NOTE — TELEPHONE ENCOUNTER
Spoke to pt who is c/o UTI symptoms. Offered to schedule pt apt today, pt refused because she has to bring her grandson to an appointment at 4 pm. Informed pt that she needed to be seen or to drop off urine sample prior to abx being sent to receive abx. Advised pt to go to  clinic by house. Pt verbalized understanding.     Called pt back and offered early morning apt due to cancellation, pt refused and stated she was flying out and would have to be at the airport prior to apt time offered. Pt requested to speak to Dr. Burroughs. She stated that once she spoke to her that Dr. Burroughs would send her something.     Pt leaves to go out of town tomorrow morning at 9 AM

## 2018-09-26 NOTE — TELEPHONE ENCOUNTER
----- Message from Marika Corea sent at 9/26/2018  2:01 PM CDT -----  Contact: self/[phone]     2nd message  .1 Patient would like to get medical advice.  Symptoms (please be specific): UTI  How long has patient had these symptoms:  This morning 09/26/18  Pharmacy name and phone#:[facility]/pharmacy #[id number] - [place] - [address] [phone] (Phone) [phone] (Fax)  Any drug allergies:no  Comments: Patient is leaving town tomorrow morning, and she would like to get an rx today, before she leaves. Patient would like to get medical advice.

## 2018-09-26 NOTE — TELEPHONE ENCOUNTER
Spoke to patient. Advised that we will need a urine sample to make sure abx sent is appropriate. She voiced understanding. She will try to get to clinic to provide a sample prior to end of day today. Will send script to [facility] for her to start once sample is collected. Advised that if her symptoms don't improve once on abx for 24 hours, then to go to an urgent care facility located near her on vacation for further work up. She voiced understanding again.

## 2018-09-28 LAB — BACTERIA UR CULT: NO GROWTH

## 2018-10-14 RX ORDER — ALENDRONATE SODIUM 70 MG/1
TABLET ORAL
Qty: 4 TABLET | Refills: 2 | Status: SHIPPED | OUTPATIENT
Start: 2018-10-14 | End: 2019-01-06 | Stop reason: SDUPTHER

## 2018-12-17 ENCOUNTER — TELEPHONE (OUTPATIENT)
Dept: INTERNAL MEDICINE | Facility: CLINIC | Age: 73
End: 2018-12-17

## 2018-12-17 NOTE — TELEPHONE ENCOUNTER
----- Message from Mariaelena Dove sent at 12/17/2018  3:16 PM CST -----  Contact: Self/[phone] or [phone]  Patient is requesting that her physical apt that was on 8/23/18 be coded as an annual wellness visit so that the patient will not receive a bill from Medicare. Please advise.

## 2018-12-17 NOTE — TELEPHONE ENCOUNTER
Pt's apt needs to be re-coded as annual wellness visit not preventative.    Please make addendum.

## 2019-01-06 RX ORDER — ALENDRONATE SODIUM 70 MG/1
TABLET ORAL
Qty: 4 TABLET | Refills: 2 | Status: SHIPPED | OUTPATIENT
Start: 2019-01-06 | End: 2019-03-25 | Stop reason: SDUPTHER

## 2019-03-25 RX ORDER — ALENDRONATE SODIUM 70 MG/1
TABLET ORAL
Qty: 12 TABLET | Refills: 3 | Status: SHIPPED | OUTPATIENT
Start: 2019-03-25 | End: 2020-03-18

## 2019-08-12 ENCOUNTER — TELEPHONE (OUTPATIENT)
Dept: INTERNAL MEDICINE | Facility: CLINIC | Age: 74
End: 2019-08-12

## 2019-08-12 DIAGNOSIS — E78.5 HYPERLIPIDEMIA, UNSPECIFIED HYPERLIPIDEMIA TYPE: ICD-10-CM

## 2019-08-12 DIAGNOSIS — E55.9 VITAMIN D DEFICIENCY: Primary | ICD-10-CM

## 2019-08-12 NOTE — TELEPHONE ENCOUNTER
----- Message from Cathy Curran sent at 8/12/2019 10:31 AM CDT -----  Contact: self   Doctor appointment and lab have been scheduled.  Please link lab orders to the lab appointment.  Date of doctor appointment:  9/17  Date of lab appointment: 9/10   Physical or EP: physical  Comments:

## 2019-09-03 NOTE — PROGRESS NOTES
Subjective:       Patient ID: Trista Lozano is a 74 y.o. female.    Chief Complaint: Annual Exam    Patient is a 74 y.o.female who presents today for follow up on medical state.    Cholesterol: (reviewed)  Vaccines: Influenza (yearly); Tetanus (2015) Pneumovax (2014); Zoster (declines)  Eye exam: up to date  Mammogram: due  Gyn exam: dr. wilhelm  Colonoscopy: 2016; repeat in five years; doesn't want to do it again  DEXA: 2018  Exercise: active  Diet: healthy    On fosamax for bones; taking calcium as well  Review of Systems   Constitutional: Negative for appetite change, chills, diaphoresis and fever.   HENT: Negative for congestion, ear discharge, ear pain, postnasal drip, tinnitus, trouble swallowing and voice change.    Eyes: Negative for discharge, redness and itching.   Respiratory: Negative for cough, chest tightness, shortness of breath and wheezing.    Cardiovascular: Negative for chest pain, palpitations and leg swelling.   Gastrointestinal: Negative for abdominal pain, constipation, diarrhea, nausea and vomiting.   Endocrine: Negative for cold intolerance and heat intolerance.   Genitourinary: Negative for difficulty urinating, flank pain, hematuria and urgency.   Musculoskeletal: Negative for arthralgias, gait problem, myalgias and neck stiffness.   Skin: Negative for color change and rash.   Neurological: Negative for dizziness, seizures, syncope and headaches.   Hematological: Negative for adenopathy.   Psychiatric/Behavioral: Negative for agitation, behavioral problems, confusion and sleep disturbance.       Objective:      Physical Exam   Constitutional: She is oriented to person, place, and time. She appears well-developed and well-nourished. No distress.   HENT:   Head: Normocephalic and atraumatic.   Right Ear: External ear normal.   Left Ear: External ear normal.   Nose: Nose normal.   Mouth/Throat: Oropharynx is clear and moist. No oropharyngeal exudate.   Eyes: Pupils are equal, round, and reactive  to light. Conjunctivae and EOM are normal. Right eye exhibits no discharge. Left eye exhibits no discharge. No scleral icterus.   Neck: Neck supple. No JVD present. No tracheal deviation present. No thyromegaly present.   Cardiovascular: Normal rate, normal heart sounds and intact distal pulses. Exam reveals no gallop and no friction rub.   No murmur heard.  Pulmonary/Chest: Effort normal and breath sounds normal. No stridor. No respiratory distress. She has no wheezes. She has no rales. She exhibits no tenderness.   Abdominal: Soft. Bowel sounds are normal. She exhibits no distension. There is no tenderness. There is no rebound.   Musculoskeletal: She exhibits no edema or tenderness.   Lymphadenopathy:     She has no cervical adenopathy.   Neurological: She is alert and oriented to person, place, and time.   Skin: Skin is warm and dry. No rash noted. She is not diaphoretic. No erythema.   Psychiatric: She has a normal mood and affect. Her behavior is normal.   Nursing note and vitals reviewed.      Assessment and Plan:       1.Follow up visit/ osteopenia  - labs reviewed  - shingrix printed  - flu shot today    2. Visit for screening mammogram  - Mammo Digital Screening Bilat; Future          No follow-ups on file.

## 2019-09-10 ENCOUNTER — LAB VISIT (OUTPATIENT)
Dept: LAB | Facility: HOSPITAL | Age: 74
End: 2019-09-10
Attending: INTERNAL MEDICINE
Payer: MEDICARE

## 2019-09-10 DIAGNOSIS — E55.9 VITAMIN D DEFICIENCY: ICD-10-CM

## 2019-09-10 DIAGNOSIS — E78.5 HYPERLIPIDEMIA, UNSPECIFIED HYPERLIPIDEMIA TYPE: ICD-10-CM

## 2019-09-10 LAB
25(OH)D3+25(OH)D2 SERPL-MCNC: 50 NG/ML (ref 30–96)
ALBUMIN SERPL BCP-MCNC: 4.3 G/DL (ref 3.5–5.2)
ALP SERPL-CCNC: 76 U/L (ref 55–135)
ALT SERPL W/O P-5'-P-CCNC: 27 U/L (ref 10–44)
ANION GAP SERPL CALC-SCNC: 9 MMOL/L (ref 8–16)
AST SERPL-CCNC: 26 U/L (ref 10–40)
BASOPHILS # BLD AUTO: 0.05 K/UL (ref 0–0.2)
BASOPHILS NFR BLD: 0.8 % (ref 0–1.9)
BILIRUB SERPL-MCNC: 0.6 MG/DL (ref 0.1–1)
BUN SERPL-MCNC: 13 MG/DL (ref 8–23)
CALCIUM SERPL-MCNC: 9.3 MG/DL (ref 8.7–10.5)
CHLORIDE SERPL-SCNC: 104 MMOL/L (ref 95–110)
CHOLEST SERPL-MCNC: 216 MG/DL (ref 120–199)
CHOLEST/HDLC SERPL: 3 {RATIO} (ref 2–5)
CO2 SERPL-SCNC: 28 MMOL/L (ref 23–29)
CREAT SERPL-MCNC: 0.8 MG/DL (ref 0.5–1.4)
DIFFERENTIAL METHOD: ABNORMAL
EOSINOPHIL # BLD AUTO: 0.1 K/UL (ref 0–0.5)
EOSINOPHIL NFR BLD: 2.2 % (ref 0–8)
ERYTHROCYTE [DISTWIDTH] IN BLOOD BY AUTOMATED COUNT: 13.1 % (ref 11.5–14.5)
EST. GFR  (AFRICAN AMERICAN): >60 ML/MIN/1.73 M^2
EST. GFR  (NON AFRICAN AMERICAN): >60 ML/MIN/1.73 M^2
GLUCOSE SERPL-MCNC: 97 MG/DL (ref 70–110)
HCT VFR BLD AUTO: 46.9 % (ref 37–48.5)
HDLC SERPL-MCNC: 72 MG/DL (ref 40–75)
HDLC SERPL: 33.3 % (ref 20–50)
HGB BLD-MCNC: 15.2 G/DL (ref 12–16)
IMM GRANULOCYTES # BLD AUTO: 0.01 K/UL (ref 0–0.04)
IMM GRANULOCYTES NFR BLD AUTO: 0.2 % (ref 0–0.5)
LDLC SERPL CALC-MCNC: 134 MG/DL (ref 63–159)
LYMPHOCYTES # BLD AUTO: 2.5 K/UL (ref 1–4.8)
LYMPHOCYTES NFR BLD: 39.4 % (ref 18–48)
MCH RBC QN AUTO: 31.1 PG (ref 27–31)
MCHC RBC AUTO-ENTMCNC: 32.4 G/DL (ref 32–36)
MCV RBC AUTO: 96 FL (ref 82–98)
MONOCYTES # BLD AUTO: 0.5 K/UL (ref 0.3–1)
MONOCYTES NFR BLD: 7.9 % (ref 4–15)
NEUTROPHILS # BLD AUTO: 3.2 K/UL (ref 1.8–7.7)
NEUTROPHILS NFR BLD: 49.5 % (ref 38–73)
NONHDLC SERPL-MCNC: 144 MG/DL
NRBC BLD-RTO: 0 /100 WBC
PLATELET # BLD AUTO: 229 K/UL (ref 150–350)
PMV BLD AUTO: 11.5 FL (ref 9.2–12.9)
POTASSIUM SERPL-SCNC: 4 MMOL/L (ref 3.5–5.1)
PROT SERPL-MCNC: 7.4 G/DL (ref 6–8.4)
RBC # BLD AUTO: 4.88 M/UL (ref 4–5.4)
SODIUM SERPL-SCNC: 141 MMOL/L (ref 136–145)
TRIGL SERPL-MCNC: 50 MG/DL (ref 30–150)
TSH SERPL DL<=0.005 MIU/L-ACNC: 0.99 UIU/ML (ref 0.4–4)
WBC # BLD AUTO: 6.42 K/UL (ref 3.9–12.7)

## 2019-09-10 PROCEDURE — 85025 COMPLETE CBC W/AUTO DIFF WBC: CPT

## 2019-09-10 PROCEDURE — 36415 COLL VENOUS BLD VENIPUNCTURE: CPT | Mod: PO

## 2019-09-10 PROCEDURE — 80053 COMPREHEN METABOLIC PANEL: CPT

## 2019-09-10 PROCEDURE — 80061 LIPID PANEL: CPT

## 2019-09-10 PROCEDURE — 82306 VITAMIN D 25 HYDROXY: CPT

## 2019-09-10 PROCEDURE — 84443 ASSAY THYROID STIM HORMONE: CPT

## 2019-09-17 ENCOUNTER — OFFICE VISIT (OUTPATIENT)
Dept: INTERNAL MEDICINE | Facility: CLINIC | Age: 74
End: 2019-09-17
Payer: MEDICARE

## 2019-09-17 VITALS
BODY MASS INDEX: 21.94 KG/M2 | TEMPERATURE: 98 F | SYSTOLIC BLOOD PRESSURE: 120 MMHG | WEIGHT: 128.5 LBS | HEIGHT: 64 IN | HEART RATE: 68 BPM | DIASTOLIC BLOOD PRESSURE: 64 MMHG | RESPIRATION RATE: 16 BRPM

## 2019-09-17 DIAGNOSIS — M85.89 OSTEOPENIA OF MULTIPLE SITES: Primary | ICD-10-CM

## 2019-09-17 DIAGNOSIS — Z12.31 VISIT FOR SCREENING MAMMOGRAM: ICD-10-CM

## 2019-09-17 PROCEDURE — 90662 IIV NO PRSV INCREASED AG IM: CPT | Mod: PBBFAC,PO

## 2019-09-17 PROCEDURE — 99999 PR PBB SHADOW E&M-EST. PATIENT-LVL III: ICD-10-PCS | Mod: PBBFAC,,, | Performed by: INTERNAL MEDICINE

## 2019-09-17 PROCEDURE — 99999 PR PBB SHADOW E&M-EST. PATIENT-LVL III: CPT | Mod: PBBFAC,,, | Performed by: INTERNAL MEDICINE

## 2019-09-17 PROCEDURE — 99213 OFFICE O/P EST LOW 20 MIN: CPT | Mod: S$PBB,,, | Performed by: INTERNAL MEDICINE

## 2019-09-17 PROCEDURE — 99213 OFFICE O/P EST LOW 20 MIN: CPT | Mod: PBBFAC,PO | Performed by: INTERNAL MEDICINE

## 2019-09-17 PROCEDURE — 99213 PR OFFICE/OUTPT VISIT, EST, LEVL III, 20-29 MIN: ICD-10-PCS | Mod: S$PBB,,, | Performed by: INTERNAL MEDICINE

## 2019-09-17 NOTE — Clinical Note
Sana: I accidentally coded this visit wrong with medicare. Is it possible to re-submit it and see if pt stops getting a bill. I fixed the billing and codes.

## 2019-10-02 ENCOUNTER — HOSPITAL ENCOUNTER (OUTPATIENT)
Dept: RADIOLOGY | Facility: HOSPITAL | Age: 74
Discharge: HOME | End: 2019-10-02
Attending: INTERNAL MEDICINE
Payer: MEDICARE

## 2019-10-02 DIAGNOSIS — Z12.31 VISIT FOR SCREENING MAMMOGRAM: ICD-10-CM

## 2019-10-02 PROCEDURE — 77063 BREAST TOMOSYNTHESIS BI: CPT | Mod: 26,,, | Performed by: RADIOLOGY

## 2019-10-02 PROCEDURE — 77063 MAMMO DIGITAL SCREENING BILAT WITH TOMOSYNTHESIS_CAD: ICD-10-PCS | Mod: 26,,, | Performed by: RADIOLOGY

## 2019-10-02 PROCEDURE — 77067 MAMMO DIGITAL SCREENING BILAT WITH TOMOSYNTHESIS_CAD: ICD-10-PCS | Mod: 26,,, | Performed by: RADIOLOGY

## 2019-10-02 PROCEDURE — 77067 SCR MAMMO BI INCL CAD: CPT | Mod: TC,PO

## 2019-10-02 PROCEDURE — 77067 SCR MAMMO BI INCL CAD: CPT | Mod: 26,,, | Performed by: RADIOLOGY

## 2020-01-03 ENCOUNTER — OFFICE VISIT (OUTPATIENT)
Dept: URGENT CARE | Facility: CLINIC | Age: 75
End: 2020-01-03
Payer: MEDICARE

## 2020-01-03 VITALS
BODY MASS INDEX: 20.49 KG/M2 | HEIGHT: 64 IN | DIASTOLIC BLOOD PRESSURE: 69 MMHG | SYSTOLIC BLOOD PRESSURE: 140 MMHG | TEMPERATURE: 97 F | RESPIRATION RATE: 12 BRPM | OXYGEN SATURATION: 98 % | WEIGHT: 120 LBS | HEART RATE: 80 BPM

## 2020-01-03 DIAGNOSIS — J04.0 LARYNGITIS: Primary | ICD-10-CM

## 2020-01-03 DIAGNOSIS — R09.82 POST-NASAL DRAINAGE: ICD-10-CM

## 2020-01-03 PROCEDURE — 96372 PR INJECTION,THERAP/PROPH/DIAG2ST, IM OR SUBCUT: ICD-10-PCS | Mod: S$GLB,,, | Performed by: EMERGENCY MEDICINE

## 2020-01-03 PROCEDURE — 99214 PR OFFICE/OUTPT VISIT, EST, LEVL IV, 30-39 MIN: ICD-10-PCS | Mod: 25,S$GLB,, | Performed by: NURSE PRACTITIONER

## 2020-01-03 PROCEDURE — 99214 OFFICE O/P EST MOD 30 MIN: CPT | Mod: 25,S$GLB,, | Performed by: NURSE PRACTITIONER

## 2020-01-03 PROCEDURE — 96372 THER/PROPH/DIAG INJ SC/IM: CPT | Mod: S$GLB,,, | Performed by: EMERGENCY MEDICINE

## 2020-01-03 RX ORDER — BETAMETHASONE SODIUM PHOSPHATE AND BETAMETHASONE ACETATE 3; 3 MG/ML; MG/ML
6 INJECTION, SUSPENSION INTRA-ARTICULAR; INTRALESIONAL; INTRAMUSCULAR; SOFT TISSUE
Status: COMPLETED | OUTPATIENT
Start: 2020-01-03 | End: 2020-01-03

## 2020-01-03 RX ADMIN — BETAMETHASONE SODIUM PHOSPHATE AND BETAMETHASONE ACETATE 6 MG: 3; 3 INJECTION, SUSPENSION INTRA-ARTICULAR; INTRALESIONAL; INTRAMUSCULAR; SOFT TISSUE at 11:01

## 2020-01-03 NOTE — PATIENT INSTRUCTIONS
Sore throat recommendations: Warm fluids, warm salt water gargles, throat lozenges, tea, honey, soup, rest, hydration.      Laryngitis    Laryngitis is a swelling of the tissues around the vocal cords. Symptoms include a hoarse (scratchy) voice. The voice may be lost completely. It may be caused by a viral illness, such as a head or chest cold. It may also be due to overuse and strain of the voice. Smoking, drinking alcohol, acid reflux, allergies, or inhaling harsh chemicals may also lead to symptoms. This condition will usually resolve in 1-2 weeks.  Home care  · Rest your voice until it recovers. Talk as little as possible. If your symptoms are severe, rest at home for a day or so.  · Breathing cool steam from a humidifier/vaporizer or in a steamy shower may be helpful.  · Drink plenty of fluids to stay well hydrated.  · Do not smoke  Follow-up care  Follow up with your healthcare provider or this facility if you have not improved after one week.  When to seek medical advice  Contact your healthcare provider for any of the following:  · Severe pain with swallowing  · Trouble opening mouth  · Neck swelling, neck pain, or trouble moving neck  · Noisy breathing or trouble breathing  · Fever of 100.4°F (38.ºC) or higher, or as directed by your healthcare provider  · Drooling  · Symptoms do not resolve in 2 weeks  Date Last Reviewed: 4/26/2015  © 1102-8117 [employer]. [address] [place] [address]. All rights reserved. This information is not intended as a substitute for professional medical care. Always follow your healthcare professional's instructions.      Steroid Injection  You received a steroid shot today - As discussed, this can elevate your blood pressure, elevate your blood sugar, water weight gain, nervous energy, redness to the face and dimpling of the skin where the shot goes in.

## 2020-01-03 NOTE — PROGRESS NOTES
"BETSubjective:       Patient ID: Trista Lozano is a 74 y.o. female.    Vitals:  height is 5' 4" (1.626 m) and weight is 54.4 kg (120 lb). Her oral temperature is 97.3 °F (36.3 °C). Her blood pressure is 140/69 (abnormal) and her pulse is 80. Her respiration is 12 and oxygen saturation is 98%.     Chief Complaint: Cough    Pt c/o cough, hoarseness, sore throat, postnasal drip X 4 days    Cough   This is a new problem. Episode onset: 4 days. The problem has been gradually worsening. The cough is productive of sputum. Associated symptoms include postnasal drip and weight loss. Pertinent negatives include no chest pain, chills, ear congestion, ear pain, eye redness, fever, headaches, heartburn, hemoptysis, myalgias, nasal congestion, rash, rhinorrhea, sore throat, shortness of breath, sweats or wheezing. Treatments tried: honey, lemon juice, salt gargle. The treatment provided no relief.       Constitution: Negative for chills, sweating, fatigue and fever.   HENT: Positive for postnasal drip. Negative for ear pain, congestion, sinus pain, sinus pressure, sore throat and voice change.    Neck: Negative for painful lymph nodes.   Cardiovascular: Negative for chest pain.   Eyes: Negative for eye redness.   Respiratory: Negative for chest tightness, cough, sputum production, bloody sputum, COPD, shortness of breath, stridor, wheezing and asthma.    Gastrointestinal: Negative for nausea, vomiting and heartburn.   Musculoskeletal: Negative for muscle ache.   Skin: Negative for rash.   Allergic/Immunologic: Negative for seasonal allergies and asthma.   Neurological: Negative for headaches.   Hematologic/Lymphatic: Negative for swollen lymph nodes.       Objective:      Physical Exam   Constitutional: She is oriented to person, place, and time. She appears well-developed and well-nourished. She is cooperative.  Non-toxic appearance. She does not have a sickly appearance. She does not appear ill. No distress.   HENT:   Head: " Normocephalic.   Right Ear: Hearing, tympanic membrane, external ear and ear canal normal.   Left Ear: Hearing, tympanic membrane, external ear and ear canal normal.   Nose: Nose normal. Right sinus exhibits no maxillary sinus tenderness and no frontal sinus tenderness. Left sinus exhibits no maxillary sinus tenderness and no frontal sinus tenderness.   Mouth/Throat: Uvula is midline and mucous membranes are normal. Posterior oropharyngeal erythema present.   Post nasal drainage    +hoarseness   Eyes: Conjunctivae and lids are normal.   Cardiovascular: Normal rate, regular rhythm, normal heart sounds and normal pulses.   Pulmonary/Chest: Effort normal and breath sounds normal. No accessory muscle usage or stridor. No apnea, no tachypnea and no bradypnea. No respiratory distress. She has no decreased breath sounds. She has no wheezes. She has no rhonchi. She has no rales.   Abdominal: Normal appearance.   Neurological: She is alert and oriented to person, place, and time.   Nursing note and vitals reviewed.        Assessment:       1. Laryngitis    2. Post-nasal drainage        Plan:         Laryngitis  -     betamethasone acetate-betamethasone sodium phosphate injection 6 mg    Post-nasal drainage  -     betamethasone acetate-betamethasone sodium phosphate injection 6 mg         Patient Instructions   Sore throat recommendations: Warm fluids, warm salt water gargles, throat lozenges, tea, honey, soup, rest, hydration.      Laryngitis    Laryngitis is a swelling of the tissues around the vocal cords. Symptoms include a hoarse (scratchy) voice. The voice may be lost completely. It may be caused by a viral illness, such as a head or chest cold. It may also be due to overuse and strain of the voice. Smoking, drinking alcohol, acid reflux, allergies, or inhaling harsh chemicals may also lead to symptoms. This condition will usually resolve in 1-2 weeks.  Home care  · Rest your voice until it recovers. Talk as little as  possible. If your symptoms are severe, rest at home for a day or so.  · Breathing cool steam from a humidifier/vaporizer or in a steamy shower may be helpful.  · Drink plenty of fluids to stay well hydrated.  · Do not smoke  Follow-up care  Follow up with your healthcare provider or this facility if you have not improved after one week.  When to seek medical advice  Contact your healthcare provider for any of the following:  · Severe pain with swallowing  · Trouble opening mouth  · Neck swelling, neck pain, or trouble moving neck  · Noisy breathing or trouble breathing  · Fever of 100.4°F (38.ºC) or higher, or as directed by your healthcare provider  · Drooling  · Symptoms do not resolve in 2 weeks  Date Last Reviewed: 4/26/2015  © 6599-5670 [employer] "[employer] [address] [place] [address], [place] [address]. All rights reserved. This information is not intended as a substitute for professional medical care. Always follow your healthcare professional's instructions.      Steroid Injection  You received a steroid shot today - As discussed, this can elevate your blood pressure, elevate your blood sugar, water weight gain, nervous energy, redness to the face and dimpling of the skin where the shot goes in.

## 2020-03-18 RX ORDER — ALENDRONATE SODIUM 70 MG/1
TABLET ORAL
Qty: 12 TABLET | Refills: 3 | Status: SHIPPED | OUTPATIENT
Start: 2020-03-18 | End: 2021-03-04

## 2020-04-28 ENCOUNTER — TELEPHONE (OUTPATIENT)
Dept: INTERNAL MEDICINE | Facility: CLINIC | Age: 75
End: 2020-04-28

## 2020-04-28 NOTE — TELEPHONE ENCOUNTER
Let me change the code and send to [employer] to see if we can fix it. Will let her know once sharon gets back to me

## 2020-04-28 NOTE — TELEPHONE ENCOUNTER
Spoke to pt and she states she is getting a bill for her visit on 9/17/19. She called medicare and they told her it should have been covered and the coding must have been wrong. She called billing and the sent it to coding and she is still getting bills.   Knows that you can not do anything about the bill but wants to know if you can check the coding to see that it was coded as annual visit    Please advise

## 2020-04-28 NOTE — TELEPHONE ENCOUNTER
Spoke to pt and told her Dr Burroughs is going to  change the code and send to finance to see if we can fix it. Will let her know once finance gets back to me. She verbalized understanding

## 2020-04-28 NOTE — TELEPHONE ENCOUNTER
----- Message from Cathy Curran sent at 4/28/2020 10:33 AM CDT -----  Contact: self  or cell   Patient called in regards to a bill she received for her annual visit on 09/17/2019 for $109.06 she states billing advised her that it was coded incorrectly and Dr Girard will need to correct this patient states she has been going back and forth with billing and nothing has been done she keeps getting bills please advise.

## 2020-06-27 ENCOUNTER — CLINICAL SUPPORT (OUTPATIENT)
Dept: URGENT CARE | Facility: CLINIC | Age: 75
End: 2020-06-27
Payer: MEDICARE

## 2020-06-27 VITALS — OXYGEN SATURATION: 98 % | HEART RATE: 87 BPM | TEMPERATURE: 98 F

## 2020-06-27 DIAGNOSIS — Z11.9 ENCOUNTER FOR SCREENING EXAMINATION FOR INFECTIOUS DISEASE: ICD-10-CM

## 2020-06-27 PROCEDURE — U0003 INFECTIOUS AGENT DETECTION BY NUCLEIC ACID (DNA OR RNA); SEVERE ACUTE RESPIRATORY SYNDROME CORONAVIRUS 2 (SARS-COV-2) (CORONAVIRUS DISEASE [COVID-19]), AMPLIFIED PROBE TECHNIQUE, MAKING USE OF HIGH THROUGHPUT TECHNOLOGIES AS DESCRIBED BY CMS-2020-01-R: HCPCS

## 2020-07-02 LAB — SARS-COV-2 RNA RESP QL NAA+PROBE: NOT DETECTED

## 2020-07-13 ENCOUNTER — TELEPHONE (OUTPATIENT)
Dept: INTERNAL MEDICINE | Facility: CLINIC | Age: 75
End: 2020-07-13

## 2020-07-13 RX ORDER — NITROFURANTOIN (MACROCRYSTALS) 100 MG/1
100 CAPSULE ORAL
Qty: 15 CAPSULE | Refills: 0 | Status: SHIPPED | OUTPATIENT
Start: 2020-07-13 | End: 2021-01-22 | Stop reason: ALTCHOICE

## 2020-07-13 NOTE — TELEPHONE ENCOUNTER
----- Message from Lorna Howell sent at 7/13/2020  1:22 PM CDT -----  Contact: self/[phone]  Pt called in regards to getting a rx due to having a uti. She would like a call back.     Please advise

## 2020-07-13 NOTE — TELEPHONE ENCOUNTER
Urgency and a pulling and when she go to bathroom she only has a few drops .  She caring for her daughter who undergoing chemo and she her only ride

## 2020-09-18 ENCOUNTER — TELEPHONE (OUTPATIENT)
Dept: INTERNAL MEDICINE | Facility: CLINIC | Age: 75
End: 2020-09-18

## 2020-09-18 DIAGNOSIS — Z12.31 VISIT FOR SCREENING MAMMOGRAM: Primary | ICD-10-CM

## 2020-09-18 DIAGNOSIS — E55.9 VITAMIN D DEFICIENCY: ICD-10-CM

## 2020-09-18 DIAGNOSIS — Z78.0 POSTMENOPAUSAL: ICD-10-CM

## 2020-09-18 DIAGNOSIS — E78.5 HYPERLIPIDEMIA, UNSPECIFIED HYPERLIPIDEMIA TYPE: Primary | ICD-10-CM

## 2020-09-18 NOTE — TELEPHONE ENCOUNTER
Rescheduled pt to January, she is very upset and refused another provider.     Pt due for dexa and mammo, okay to order?

## 2020-09-18 NOTE — TELEPHONE ENCOUNTER
----- Message from Marika Corea sent at 9/18/2020 11:04 AM CDT -----  Contact: [phone]  Patient is returning a phone call.  Who left a message for the patient: Dr Gonzales's office  Does patient know what this is regarding:  appointment  Comments: informed pt that next available shyanne is not until 01/20, pt stated that she has been waiting for shyanne since May 20. Please call and advise. Thank you

## 2020-10-13 ENCOUNTER — HOSPITAL ENCOUNTER (OUTPATIENT)
Dept: RADIOLOGY | Facility: HOSPITAL | Age: 75
Discharge: HOME | End: 2020-10-13
Attending: INTERNAL MEDICINE
Payer: MEDICARE

## 2020-10-13 DIAGNOSIS — Z12.31 VISIT FOR SCREENING MAMMOGRAM: ICD-10-CM

## 2020-10-13 PROCEDURE — 77067 SCR MAMMO BI INCL CAD: CPT | Mod: 26,,, | Performed by: RADIOLOGY

## 2020-10-13 PROCEDURE — 77063 MAMMO DIGITAL SCREENING BILAT WITH TOMO: ICD-10-PCS | Mod: 26,,, | Performed by: RADIOLOGY

## 2020-10-13 PROCEDURE — 77063 BREAST TOMOSYNTHESIS BI: CPT | Mod: 26,,, | Performed by: RADIOLOGY

## 2020-10-13 PROCEDURE — 77067 SCR MAMMO BI INCL CAD: CPT | Mod: TC,PO

## 2020-10-13 PROCEDURE — 77067 MAMMO DIGITAL SCREENING BILAT WITH TOMO: ICD-10-PCS | Mod: 26,,, | Performed by: RADIOLOGY

## 2021-01-04 ENCOUNTER — PATIENT MESSAGE (OUTPATIENT)
Dept: ADMINISTRATIVE | Facility: HOSPITAL | Age: 76
End: 2021-01-04

## 2021-01-09 ENCOUNTER — IMMUNIZATION (OUTPATIENT)
Dept: INTERNAL MEDICINE | Facility: CLINIC | Age: 76
End: 2021-01-09
Payer: MEDICARE

## 2021-01-09 DIAGNOSIS — Z23 NEED FOR VACCINATION: ICD-10-CM

## 2021-01-09 PROCEDURE — 91300 COVID-19, MRNA, LNP-S, PF, 30 MCG/0.3 ML DOSE VACCINE: CPT | Mod: PBBFAC | Performed by: FAMILY MEDICINE

## 2021-01-15 ENCOUNTER — LAB VISIT (OUTPATIENT)
Dept: LAB | Facility: HOSPITAL | Age: 76
End: 2021-01-15
Attending: INTERNAL MEDICINE
Payer: MEDICARE

## 2021-01-15 DIAGNOSIS — E78.5 HYPERLIPIDEMIA, UNSPECIFIED HYPERLIPIDEMIA TYPE: ICD-10-CM

## 2021-01-15 DIAGNOSIS — E55.9 VITAMIN D DEFICIENCY: ICD-10-CM

## 2021-01-15 LAB
25(OH)D3+25(OH)D2 SERPL-MCNC: 50 NG/ML (ref 30–96)
ALBUMIN SERPL BCP-MCNC: 4.3 G/DL (ref 3.5–5.2)
ALP SERPL-CCNC: 102 U/L (ref 55–135)
ALT SERPL W/O P-5'-P-CCNC: 21 U/L (ref 10–44)
ANION GAP SERPL CALC-SCNC: 9 MMOL/L (ref 8–16)
AST SERPL-CCNC: 23 U/L (ref 10–40)
BASOPHILS # BLD AUTO: 0.08 K/UL (ref 0–0.2)
BASOPHILS NFR BLD: 1.1 % (ref 0–1.9)
BILIRUB SERPL-MCNC: 0.6 MG/DL (ref 0.1–1)
BUN SERPL-MCNC: 12 MG/DL (ref 8–23)
CALCIUM SERPL-MCNC: 9.3 MG/DL (ref 8.7–10.5)
CHLORIDE SERPL-SCNC: 103 MMOL/L (ref 95–110)
CHOLEST SERPL-MCNC: 217 MG/DL (ref 120–199)
CHOLEST/HDLC SERPL: 3.1 {RATIO} (ref 2–5)
CO2 SERPL-SCNC: 29 MMOL/L (ref 23–29)
CREAT SERPL-MCNC: 0.8 MG/DL (ref 0.5–1.4)
DIFFERENTIAL METHOD: ABNORMAL
EOSINOPHIL # BLD AUTO: 0.1 K/UL (ref 0–0.5)
EOSINOPHIL NFR BLD: 1.8 % (ref 0–8)
ERYTHROCYTE [DISTWIDTH] IN BLOOD BY AUTOMATED COUNT: 13 % (ref 11.5–14.5)
EST. GFR  (AFRICAN AMERICAN): >60 ML/MIN/1.73 M^2
EST. GFR  (NON AFRICAN AMERICAN): >60 ML/MIN/1.73 M^2
GLUCOSE SERPL-MCNC: 102 MG/DL (ref 70–110)
HCT VFR BLD AUTO: 50 % (ref 37–48.5)
HDLC SERPL-MCNC: 70 MG/DL (ref 40–75)
HDLC SERPL: 32.3 % (ref 20–50)
HGB BLD-MCNC: 15.9 G/DL (ref 12–16)
IMM GRANULOCYTES # BLD AUTO: 0.01 K/UL (ref 0–0.04)
IMM GRANULOCYTES NFR BLD AUTO: 0.1 % (ref 0–0.5)
LDLC SERPL CALC-MCNC: 131.4 MG/DL (ref 63–159)
LYMPHOCYTES # BLD AUTO: 3.1 K/UL (ref 1–4.8)
LYMPHOCYTES NFR BLD: 42.1 % (ref 18–48)
MCH RBC QN AUTO: 31.1 PG (ref 27–31)
MCHC RBC AUTO-ENTMCNC: 31.8 G/DL (ref 32–36)
MCV RBC AUTO: 98 FL (ref 82–98)
MONOCYTES # BLD AUTO: 0.6 K/UL (ref 0.3–1)
MONOCYTES NFR BLD: 7.7 % (ref 4–15)
NEUTROPHILS # BLD AUTO: 3.4 K/UL (ref 1.8–7.7)
NEUTROPHILS NFR BLD: 47.2 % (ref 38–73)
NONHDLC SERPL-MCNC: 147 MG/DL
NRBC BLD-RTO: 0 /100 WBC
PLATELET # BLD AUTO: 254 K/UL (ref 150–350)
PMV BLD AUTO: 11.6 FL (ref 9.2–12.9)
POTASSIUM SERPL-SCNC: 3.8 MMOL/L (ref 3.5–5.1)
PROT SERPL-MCNC: 7.2 G/DL (ref 6–8.4)
RBC # BLD AUTO: 5.11 M/UL (ref 4–5.4)
SODIUM SERPL-SCNC: 141 MMOL/L (ref 136–145)
TRIGL SERPL-MCNC: 78 MG/DL (ref 30–150)
TSH SERPL DL<=0.005 MIU/L-ACNC: 0.91 UIU/ML (ref 0.4–4)
WBC # BLD AUTO: 7.25 K/UL (ref 3.9–12.7)

## 2021-01-15 PROCEDURE — 85025 COMPLETE CBC W/AUTO DIFF WBC: CPT

## 2021-01-15 PROCEDURE — 80061 LIPID PANEL: CPT

## 2021-01-15 PROCEDURE — 84443 ASSAY THYROID STIM HORMONE: CPT

## 2021-01-15 PROCEDURE — 80053 COMPREHEN METABOLIC PANEL: CPT

## 2021-01-15 PROCEDURE — 36415 COLL VENOUS BLD VENIPUNCTURE: CPT | Mod: PO

## 2021-01-15 PROCEDURE — 82306 VITAMIN D 25 HYDROXY: CPT

## 2021-01-22 ENCOUNTER — OFFICE VISIT (OUTPATIENT)
Dept: INTERNAL MEDICINE | Facility: CLINIC | Age: 76
End: 2021-01-22
Payer: MEDICARE

## 2021-01-22 VITALS
BODY MASS INDEX: 22.02 KG/M2 | HEIGHT: 64 IN | WEIGHT: 129 LBS | DIASTOLIC BLOOD PRESSURE: 82 MMHG | SYSTOLIC BLOOD PRESSURE: 138 MMHG | HEART RATE: 88 BPM | TEMPERATURE: 98 F | RESPIRATION RATE: 16 BRPM

## 2021-01-22 DIAGNOSIS — Z23 NEED FOR SHINGLES VACCINE: ICD-10-CM

## 2021-01-22 DIAGNOSIS — M85.80 OSTEOPENIA, UNSPECIFIED LOCATION: Primary | ICD-10-CM

## 2021-01-22 DIAGNOSIS — N39.41 URGE INCONTINENCE: ICD-10-CM

## 2021-01-22 DIAGNOSIS — R09.82 POSTNASAL DRIP: ICD-10-CM

## 2021-01-22 PROCEDURE — 99999 PR PBB SHADOW E&M-EST. PATIENT-LVL III: CPT | Mod: PBBFAC,,, | Performed by: INTERNAL MEDICINE

## 2021-01-22 PROCEDURE — 99213 OFFICE O/P EST LOW 20 MIN: CPT | Mod: PBBFAC,PO | Performed by: INTERNAL MEDICINE

## 2021-01-22 PROCEDURE — 99999 PR PBB SHADOW E&M-EST. PATIENT-LVL III: ICD-10-PCS | Mod: PBBFAC,,, | Performed by: INTERNAL MEDICINE

## 2021-01-22 PROCEDURE — 99214 PR OFFICE/OUTPT VISIT, EST, LEVL IV, 30-39 MIN: ICD-10-PCS | Mod: S$PBB,,, | Performed by: INTERNAL MEDICINE

## 2021-01-22 PROCEDURE — 99214 OFFICE O/P EST MOD 30 MIN: CPT | Mod: S$PBB,,, | Performed by: INTERNAL MEDICINE

## 2021-01-22 RX ORDER — FERROUS SULFATE, DRIED 160(50) MG
1 TABLET, EXTENDED RELEASE ORAL 2 TIMES DAILY WITH MEALS
COMMUNITY

## 2021-01-22 RX ORDER — MULTIVIT,IRON,MINERALS/LUTEIN
TABLET ORAL
COMMUNITY

## 2021-01-22 RX ORDER — ZOSTER VACCINE RECOMBINANT, ADJUVANTED 50 MCG/0.5
0.5 KIT INTRAMUSCULAR ONCE
Qty: 1 EACH | Refills: 1 | Status: SHIPPED | OUTPATIENT
Start: 2021-01-22 | End: 2021-01-22

## 2021-01-22 RX ORDER — AMOXICILLIN 500 MG
CAPSULE ORAL DAILY
COMMUNITY

## 2021-01-30 ENCOUNTER — IMMUNIZATION (OUTPATIENT)
Dept: INTERNAL MEDICINE | Facility: CLINIC | Age: 76
End: 2021-01-30
Payer: MEDICARE

## 2021-01-30 DIAGNOSIS — Z23 NEED FOR VACCINATION: Primary | ICD-10-CM

## 2021-01-30 PROCEDURE — 91300 COVID-19, MRNA, LNP-S, PF, 30 MCG/0.3 ML DOSE VACCINE: CPT | Mod: PBBFAC | Performed by: FAMILY MEDICINE

## 2021-01-30 PROCEDURE — 0002A COVID-19, MRNA, LNP-S, PF, 30 MCG/0.3 ML DOSE VACCINE: CPT | Mod: PBBFAC | Performed by: FAMILY MEDICINE

## 2021-03-18 ENCOUNTER — PATIENT MESSAGE (OUTPATIENT)
Dept: RESEARCH | Facility: HOSPITAL | Age: 76
End: 2021-03-18

## 2021-03-26 ENCOUNTER — PATIENT MESSAGE (OUTPATIENT)
Dept: RESEARCH | Facility: HOSPITAL | Age: 76
End: 2021-03-26

## 2021-08-02 ENCOUNTER — TELEPHONE (OUTPATIENT)
Dept: INTERNAL MEDICINE | Facility: CLINIC | Age: 76
End: 2021-08-02

## 2021-08-02 DIAGNOSIS — E55.9 VITAMIN D DEFICIENCY: ICD-10-CM

## 2021-08-02 DIAGNOSIS — E78.5 HYPERLIPIDEMIA, UNSPECIFIED HYPERLIPIDEMIA TYPE: Primary | ICD-10-CM

## 2021-10-12 ENCOUNTER — IMMUNIZATION (OUTPATIENT)
Dept: PRIMARY CARE CLINIC | Facility: CLINIC | Age: 76
End: 2021-10-12
Payer: MEDICARE

## 2021-10-12 DIAGNOSIS — Z23 NEED FOR VACCINATION: Primary | ICD-10-CM

## 2021-10-12 PROCEDURE — 91300 COVID-19, MRNA, LNP-S, PF, 30 MCG/0.3 ML DOSE VACCINE: CPT | Mod: PBBFAC | Performed by: INTERNAL MEDICINE

## 2021-10-12 PROCEDURE — 0003A COVID-19, MRNA, LNP-S, PF, 30 MCG/0.3 ML DOSE VACCINE: CPT | Mod: CV19,PBBFAC | Performed by: INTERNAL MEDICINE

## 2021-11-09 ENCOUNTER — TELEPHONE (OUTPATIENT)
Dept: SPORTS MEDICINE | Facility: CLINIC | Age: 76
End: 2021-11-09
Payer: MEDICARE

## 2021-11-09 DIAGNOSIS — M25.511 RIGHT SHOULDER PAIN, UNSPECIFIED CHRONICITY: Primary | ICD-10-CM

## 2021-11-15 ENCOUNTER — HOSPITAL ENCOUNTER (OUTPATIENT)
Dept: RADIOLOGY | Facility: HOSPITAL | Age: 76
Discharge: HOME | End: 2021-11-15
Attending: STUDENT IN AN ORGANIZED HEALTH CARE EDUCATION/TRAINING PROGRAM
Payer: MEDICARE

## 2021-11-15 ENCOUNTER — OFFICE VISIT (OUTPATIENT)
Dept: SPORTS MEDICINE | Facility: CLINIC | Age: 76
End: 2021-11-15
Payer: MEDICARE

## 2021-11-15 VITALS
HEIGHT: 64 IN | DIASTOLIC BLOOD PRESSURE: 78 MMHG | WEIGHT: 125 LBS | SYSTOLIC BLOOD PRESSURE: 124 MMHG | BODY MASS INDEX: 21.34 KG/M2

## 2021-11-15 DIAGNOSIS — M25.511 ACUTE PAIN OF RIGHT SHOULDER: Primary | ICD-10-CM

## 2021-11-15 DIAGNOSIS — M25.511 RIGHT SHOULDER PAIN, UNSPECIFIED CHRONICITY: ICD-10-CM

## 2021-11-15 PROCEDURE — 73030 XR SHOULDER COMPLETE 2 OR MORE VIEWS RIGHT: ICD-10-PCS | Mod: 26,RT,, | Performed by: RADIOLOGY

## 2021-11-15 PROCEDURE — 99213 OFFICE O/P EST LOW 20 MIN: CPT | Mod: PBBFAC,PO | Performed by: STUDENT IN AN ORGANIZED HEALTH CARE EDUCATION/TRAINING PROGRAM

## 2021-11-15 PROCEDURE — 97110 PR THERAPEUTIC EXERCISES: ICD-10-PCS | Mod: GP,S$PBB,, | Performed by: STUDENT IN AN ORGANIZED HEALTH CARE EDUCATION/TRAINING PROGRAM

## 2021-11-15 PROCEDURE — 99999 PR PBB SHADOW E&M-EST. PATIENT-LVL III: CPT | Mod: PBBFAC,,, | Performed by: STUDENT IN AN ORGANIZED HEALTH CARE EDUCATION/TRAINING PROGRAM

## 2021-11-15 PROCEDURE — 99204 PR OFFICE/OUTPT VISIT, NEW, LEVL IV, 45-59 MIN: ICD-10-PCS | Mod: S$PBB,,, | Performed by: STUDENT IN AN ORGANIZED HEALTH CARE EDUCATION/TRAINING PROGRAM

## 2021-11-15 PROCEDURE — 97110 THERAPEUTIC EXERCISES: CPT | Mod: GP,S$PBB,, | Performed by: STUDENT IN AN ORGANIZED HEALTH CARE EDUCATION/TRAINING PROGRAM

## 2021-11-15 PROCEDURE — 73030 X-RAY EXAM OF SHOULDER: CPT | Mod: TC,PO,RT

## 2021-11-15 PROCEDURE — 73030 X-RAY EXAM OF SHOULDER: CPT | Mod: 26,RT,, | Performed by: RADIOLOGY

## 2021-11-15 PROCEDURE — 99204 OFFICE O/P NEW MOD 45 MIN: CPT | Mod: S$PBB,,, | Performed by: STUDENT IN AN ORGANIZED HEALTH CARE EDUCATION/TRAINING PROGRAM

## 2021-11-15 PROCEDURE — 99999 PR PBB SHADOW E&M-EST. PATIENT-LVL III: ICD-10-PCS | Mod: PBBFAC,,, | Performed by: STUDENT IN AN ORGANIZED HEALTH CARE EDUCATION/TRAINING PROGRAM

## 2021-12-28 NOTE — PROGRESS NOTES
"CC: right shoulder pain    76 y.o. Female presents today for follow up evaluation of her right shoulder pain. Pt reports she has had significant improvement since her last visit. Pt reports pain is 0/10 today, and reports 6/10 sharp pain with horizontal adduction, horizontal abduction/extension (putting on jacket). Pt denies mechanical symptoms. Pt denies numbness/tingling.    Attempted treatments: HEP (performed sporadically), aleve (significant relief)  Pain score: 0/10  History of trauma/injury: none since last visit  Affecting ADLs: "not really"     REVIEW OF SYSTEMS:   Constitution: Patient denies fever or chills.  Eyes: Patient denies eye pain or vision changes.  CVS: Patient denies chest pain.  Lungs: Patient denies shortness of breath or cough.  Skin: Patient denies skin rash or itching.    Musculoskeletal: Patient denies recent falls. See HPI.  Psych: Patient denies any current anxiety or nervousness.    PAST MEDICAL HISTORY:   Past Medical History:   Diagnosis Date    Menarche     Age of onset 12       MEDICATIONS:     Current Outpatient Medications:     calcium-vitamin D3 (OS-KELI 500 + D3) 500 mg-5 mcg (200 unit) per tablet, Take 1 tablet by mouth 2 (two) times daily with meals., Disp: , Rfl:     multivit-min-iron-FA-lutein (CENTRUM SILVER WOMEN) 8 mg iron-400 mcg-300 mcg Tab, Take by mouth., Disp: , Rfl:     omega-3 fatty acids/fish oil (FISH OIL-OMEGA-3 FATTY ACIDS) 300-1,000 mg capsule, Take by mouth once daily., Disp: , Rfl:     vitamin D (VITAMIN D3) 1000 units Tab, Take 1,000 Units by mouth once daily., Disp: , Rfl:     acetaminophen (TYLENOL ARTHRITIS ORAL), Take by mouth., Disp: , Rfl:     alendronate (FOSAMAX) 70 MG tablet, TAKE 1 TABLET ONCE WEEKLY IN THE MORNING WITH A FULL GLASS OF WATER ON AN EMPTY STOMACH. (Patient not taking: No sig reported), Disp: 12 tablet, Rfl: 3    ALLERGIES:   Review of patient's allergies indicates:  No Known Allergies     PHYSICAL EXAMINATION:  /71   " "Ht 5' 4" (1.626 m)   Wt 56.7 kg (125 lb)   BMI 21.46 kg/m²   Vitals signs and nursing note have been reviewed.    General: In no acute distress, well developed, well nourished, no diaphoresis  Eyes: EOM full and smooth, no eye redness or discharge  HENT: normocephalic and atraumatic, neck supple, trachea midline, no nasal discharge  Cardiovascular: no LE edema  Lungs: respirations non-labored, no conversational dyspnea   Neuro: AAOx3, CN2-12 grossly intact  Skin: No rashes, warm and dry  Psychiatric: cooperative, pleasant, mood and affect appropriate for age    Right Shoulder:  INSPECTION / PALPATION:  -Deformity   -Ecchymosis   -Atrophy   -Sulcus sign   -No TTP over anatomy including clavicle, scapular spine, ACJ, acromion, supraspinatus, infraspinatus, bicipital groove     ROM:    Flex to 170° vs 170° contra   Abduct to 160° vs 160° contra   Int rot to T7 vs T7 contra   Ext rot to 70° vs 70° contra  -Scapular dyskinesis      STRENGTH:   Scaption 5/5   Flexion 5/5   Ext rot 5/5   Int rot 5/5     OTHER:   +Painful arc   -Drop arm   -Int lag  -Ext lag  +Neer's   +Kirkpatrick-Igor   +Larimer active compression   -Empty Can  -Full Can  -Speed's   -Apprehension    NECK:   Grossly FROM & painless in neck flex, ext, B/L torsion, B/L lat flexion   -Spurling B/L    Hands NVI B/L    ASSESSMENT:      ICD-10-CM ICD-9-CM   1. Acute pain of right shoulder  M25.511 719.41   2. Subacromial impingement, right  M75.41 726.19         PLAN:  Patient much improved since last visit, which she attributes to taking the Aleve.  Patient was placed on Aleve as an anti-inflammatory dose for 6 weeks.  Patient has been instructed to continue her exercise program.  Patient also instructed to stop the Aleve.  Should pain return, patient should contact our office and schedule an appointment for subacromial injection.    Future planning includes - stop Aleve, continue HEP, possible subacromial injection    All questions were answered to the best " of my ability and all concerns were addressed at this time.    Follow up for above, or sooner if needed.      This note is dictated using the M*Modal Fluency Direct word recognition program. There are word recognition mistakes that are occasionally missed on review.

## 2022-01-03 ENCOUNTER — OFFICE VISIT (OUTPATIENT)
Dept: SPORTS MEDICINE | Facility: CLINIC | Age: 77
End: 2022-01-03
Payer: MEDICARE

## 2022-01-03 VITALS
BODY MASS INDEX: 21.34 KG/M2 | HEIGHT: 64 IN | SYSTOLIC BLOOD PRESSURE: 126 MMHG | DIASTOLIC BLOOD PRESSURE: 71 MMHG | WEIGHT: 125 LBS

## 2022-01-03 DIAGNOSIS — M75.41 SUBACROMIAL IMPINGEMENT, RIGHT: ICD-10-CM

## 2022-01-03 DIAGNOSIS — M25.511 ACUTE PAIN OF RIGHT SHOULDER: Primary | ICD-10-CM

## 2022-01-03 PROCEDURE — 99213 OFFICE O/P EST LOW 20 MIN: CPT | Mod: PBBFAC,PO | Performed by: STUDENT IN AN ORGANIZED HEALTH CARE EDUCATION/TRAINING PROGRAM

## 2022-01-03 PROCEDURE — 99213 OFFICE O/P EST LOW 20 MIN: CPT | Mod: S$PBB,,, | Performed by: STUDENT IN AN ORGANIZED HEALTH CARE EDUCATION/TRAINING PROGRAM

## 2022-01-03 PROCEDURE — 99999 PR PBB SHADOW E&M-EST. PATIENT-LVL III: ICD-10-PCS | Mod: PBBFAC,,, | Performed by: STUDENT IN AN ORGANIZED HEALTH CARE EDUCATION/TRAINING PROGRAM

## 2022-01-03 PROCEDURE — 99213 PR OFFICE/OUTPT VISIT, EST, LEVL III, 20-29 MIN: ICD-10-PCS | Mod: S$PBB,,, | Performed by: STUDENT IN AN ORGANIZED HEALTH CARE EDUCATION/TRAINING PROGRAM

## 2022-01-03 PROCEDURE — 99999 PR PBB SHADOW E&M-EST. PATIENT-LVL III: CPT | Mod: PBBFAC,,, | Performed by: STUDENT IN AN ORGANIZED HEALTH CARE EDUCATION/TRAINING PROGRAM

## 2022-01-03 RX ORDER — CHOLECALCIFEROL (VITAMIN D3) 25 MCG
1000 TABLET ORAL DAILY
COMMUNITY

## 2022-01-11 NOTE — PROGRESS NOTES
Subjective:       Patient ID: Trista Lozano is a 76 y.o. female.    Chief Complaint: Annual Exam and Flu Vaccine    Patient is a 76 y.o.female who presents today for follow up on chronic medical conditions    Cholesterol: (reviewed)  Vaccines: Influenza (yearly); Tetanus (2015) Pneumovax (2014); Zoster (declines)  Eye exam: up to date  Mammogram: due now  Gyn exam: dr. wilhelm  Colonoscopy: 2016; repeat in five years; doesn't want to do it again  DEXA: 2020; off fosamax; dentist prefers her not to be on fosamax; she had a tooth infection which needed an implant; she is calcium on vitamin D; off fosamax for about six months. ; due in october  Exercise: active  Diet: healthy  Review of Systems   Constitutional: Negative for appetite change, chills, diaphoresis and fever.   HENT: Negative for congestion, ear discharge, ear pain, postnasal drip, tinnitus, trouble swallowing and voice change.    Eyes: Negative for discharge, redness and itching.   Respiratory: Negative for cough, chest tightness, shortness of breath and wheezing.    Cardiovascular: Negative for chest pain, palpitations and leg swelling.   Gastrointestinal: Negative for abdominal pain, constipation, diarrhea, nausea and vomiting.   Endocrine: Negative for cold intolerance and heat intolerance.   Genitourinary: Negative for difficulty urinating, flank pain, hematuria and urgency.   Musculoskeletal: Negative for arthralgias, gait problem, myalgias and neck stiffness.   Skin: Negative for color change and rash.   Neurological: Negative for dizziness, seizures, syncope and headaches.   Hematological: Negative for adenopathy.   Psychiatric/Behavioral: Negative for agitation, behavioral problems, confusion and sleep disturbance.       Objective:      Physical Exam  Vitals and nursing note reviewed.   Constitutional:       General: She is not in acute distress.     Appearance: She is well-developed and well-nourished. She is not diaphoretic.   HENT:      Head:  Normocephalic and atraumatic.      Right Ear: External ear normal.      Left Ear: External ear normal.      Nose: Nose normal.      Mouth/Throat:      Mouth: Oropharynx is clear and moist.      Pharynx: No oropharyngeal exudate.   Eyes:      General: No scleral icterus.        Right eye: No discharge.         Left eye: No discharge.      Extraocular Movements: EOM normal.      Conjunctiva/sclera: Conjunctivae normal.      Pupils: Pupils are equal, round, and reactive to light.   Neck:      Thyroid: No thyromegaly.      Vascular: No JVD.      Trachea: No tracheal deviation.   Cardiovascular:      Rate and Rhythm: Normal rate.      Pulses: Intact distal pulses.      Heart sounds: Normal heart sounds. No murmur heard.  No friction rub. No gallop.    Pulmonary:      Effort: Pulmonary effort is normal. No respiratory distress.      Breath sounds: Normal breath sounds. No stridor. No wheezing or rales.   Chest:      Chest wall: No tenderness.   Abdominal:      General: Bowel sounds are normal. There is no distension.      Palpations: Abdomen is soft.      Tenderness: There is no abdominal tenderness. There is no rebound.   Musculoskeletal:         General: No tenderness or edema.      Cervical back: Neck supple.   Lymphadenopathy:      Cervical: No cervical adenopathy.   Skin:     General: Skin is warm and dry.      Findings: No erythema or rash.   Neurological:      Mental Status: She is alert and oriented to person, place, and time.   Psychiatric:         Mood and Affect: Mood and affect normal.         Behavior: Behavior normal.         Assessment and Plan:       1. Osteopenia, unspecified location  On calcium and vitamin D    2. Vitamin D deficiency  stable    3. Hyperlipidemia, unspecified hyperlipidemia type  Stable; much improved    4. Visit for screening mammogram    - Mammo Digital Screening Bilat w/ [facility]; Future    5. Screen for colon cancer  Declines for now; will consider next year          No follow-ups on  file.

## 2022-01-21 ENCOUNTER — LAB VISIT (OUTPATIENT)
Dept: LAB | Facility: HOSPITAL | Age: 77
End: 2022-01-21
Attending: INTERNAL MEDICINE
Payer: MEDICARE

## 2022-01-21 DIAGNOSIS — E55.9 VITAMIN D DEFICIENCY: ICD-10-CM

## 2022-01-21 DIAGNOSIS — E78.5 HYPERLIPIDEMIA, UNSPECIFIED HYPERLIPIDEMIA TYPE: ICD-10-CM

## 2022-01-21 LAB
25(OH)D3+25(OH)D2 SERPL-MCNC: 53 NG/ML (ref 30–96)
ALBUMIN SERPL BCP-MCNC: 4.3 G/DL (ref 3.5–5.2)
ALP SERPL-CCNC: 78 U/L (ref 55–135)
ALT SERPL W/O P-5'-P-CCNC: 23 U/L (ref 10–44)
ANION GAP SERPL CALC-SCNC: 12 MMOL/L (ref 8–16)
AST SERPL-CCNC: 26 U/L (ref 10–40)
BASOPHILS # BLD AUTO: 0.07 K/UL (ref 0–0.2)
BASOPHILS NFR BLD: 0.9 % (ref 0–1.9)
BILIRUB SERPL-MCNC: 0.6 MG/DL (ref 0.1–1)
BUN SERPL-MCNC: 16 MG/DL (ref 8–23)
CALCIUM SERPL-MCNC: 10.4 MG/DL (ref 8.7–10.5)
CHLORIDE SERPL-SCNC: 103 MMOL/L (ref 95–110)
CHOLEST SERPL-MCNC: 192 MG/DL (ref 120–199)
CHOLEST/HDLC SERPL: 2.8 {RATIO} (ref 2–5)
CO2 SERPL-SCNC: 25 MMOL/L (ref 23–29)
CREAT SERPL-MCNC: 0.9 MG/DL (ref 0.5–1.4)
DIFFERENTIAL METHOD: NORMAL
EOSINOPHIL # BLD AUTO: 0.1 K/UL (ref 0–0.5)
EOSINOPHIL NFR BLD: 0.9 % (ref 0–8)
ERYTHROCYTE [DISTWIDTH] IN BLOOD BY AUTOMATED COUNT: 12.8 % (ref 11.5–14.5)
EST. GFR  (AFRICAN AMERICAN): >60 ML/MIN/1.73 M^2
EST. GFR  (NON AFRICAN AMERICAN): >60 ML/MIN/1.73 M^2
GLUCOSE SERPL-MCNC: 100 MG/DL (ref 70–110)
HCT VFR BLD AUTO: 47.6 % (ref 37–48.5)
HDLC SERPL-MCNC: 68 MG/DL (ref 40–75)
HDLC SERPL: 35.4 % (ref 20–50)
HGB BLD-MCNC: 15.8 G/DL (ref 12–16)
IMM GRANULOCYTES # BLD AUTO: 0.02 K/UL (ref 0–0.04)
IMM GRANULOCYTES NFR BLD AUTO: 0.3 % (ref 0–0.5)
LDLC SERPL CALC-MCNC: 114.8 MG/DL (ref 63–159)
LYMPHOCYTES # BLD AUTO: 2.2 K/UL (ref 1–4.8)
LYMPHOCYTES NFR BLD: 28.1 % (ref 18–48)
MCH RBC QN AUTO: 31 PG (ref 27–31)
MCHC RBC AUTO-ENTMCNC: 33.2 G/DL (ref 32–36)
MCV RBC AUTO: 94 FL (ref 82–98)
MONOCYTES # BLD AUTO: 0.6 K/UL (ref 0.3–1)
MONOCYTES NFR BLD: 8.1 % (ref 4–15)
NEUTROPHILS # BLD AUTO: 4.7 K/UL (ref 1.8–7.7)
NEUTROPHILS NFR BLD: 61.7 % (ref 38–73)
NONHDLC SERPL-MCNC: 124 MG/DL
NRBC BLD-RTO: 0 /100 WBC
PLATELET # BLD AUTO: 283 K/UL (ref 150–450)
PMV BLD AUTO: 11.7 FL (ref 9.2–12.9)
POTASSIUM SERPL-SCNC: 3.8 MMOL/L (ref 3.5–5.1)
PROT SERPL-MCNC: 7.5 G/DL (ref 6–8.4)
RBC # BLD AUTO: 5.09 M/UL (ref 4–5.4)
SODIUM SERPL-SCNC: 140 MMOL/L (ref 136–145)
TRIGL SERPL-MCNC: 46 MG/DL (ref 30–150)
TSH SERPL DL<=0.005 MIU/L-ACNC: 0.56 UIU/ML (ref 0.4–4)
WBC # BLD AUTO: 7.65 K/UL (ref 3.9–12.7)

## 2022-01-21 PROCEDURE — 80061 LIPID PANEL: CPT | Performed by: INTERNAL MEDICINE

## 2022-01-21 PROCEDURE — 85025 COMPLETE CBC W/AUTO DIFF WBC: CPT | Performed by: INTERNAL MEDICINE

## 2022-01-21 PROCEDURE — 82306 VITAMIN D 25 HYDROXY: CPT | Performed by: INTERNAL MEDICINE

## 2022-01-21 PROCEDURE — 80053 COMPREHEN METABOLIC PANEL: CPT | Performed by: INTERNAL MEDICINE

## 2022-01-21 PROCEDURE — 84443 ASSAY THYROID STIM HORMONE: CPT | Performed by: INTERNAL MEDICINE

## 2022-01-21 PROCEDURE — 36415 COLL VENOUS BLD VENIPUNCTURE: CPT | Mod: PO | Performed by: INTERNAL MEDICINE

## 2022-01-25 ENCOUNTER — OFFICE VISIT (OUTPATIENT)
Dept: INTERNAL MEDICINE | Facility: CLINIC | Age: 77
End: 2022-01-25
Payer: MEDICARE

## 2022-01-25 VITALS
SYSTOLIC BLOOD PRESSURE: 130 MMHG | WEIGHT: 121.69 LBS | DIASTOLIC BLOOD PRESSURE: 76 MMHG | HEART RATE: 73 BPM | BODY MASS INDEX: 20.78 KG/M2 | HEIGHT: 64 IN | RESPIRATION RATE: 20 BRPM | TEMPERATURE: 98 F | OXYGEN SATURATION: 98 %

## 2022-01-25 DIAGNOSIS — M85.80 OSTEOPENIA, UNSPECIFIED LOCATION: Primary | ICD-10-CM

## 2022-01-25 DIAGNOSIS — Z12.31 VISIT FOR SCREENING MAMMOGRAM: ICD-10-CM

## 2022-01-25 DIAGNOSIS — E55.9 VITAMIN D DEFICIENCY: ICD-10-CM

## 2022-01-25 DIAGNOSIS — Z12.11 SCREEN FOR COLON CANCER: ICD-10-CM

## 2022-01-25 DIAGNOSIS — E78.5 HYPERLIPIDEMIA, UNSPECIFIED HYPERLIPIDEMIA TYPE: ICD-10-CM

## 2022-01-25 PROCEDURE — 99214 OFFICE O/P EST MOD 30 MIN: CPT | Mod: PBBFAC,PO,25 | Performed by: INTERNAL MEDICINE

## 2022-01-25 PROCEDURE — 99999 PR PBB SHADOW E&M-EST. PATIENT-LVL IV: ICD-10-PCS | Mod: PBBFAC,,, | Performed by: INTERNAL MEDICINE

## 2022-01-25 PROCEDURE — 99999 PR PBB SHADOW E&M-EST. PATIENT-LVL IV: CPT | Mod: PBBFAC,,, | Performed by: INTERNAL MEDICINE

## 2022-01-25 PROCEDURE — G0008 ADMIN INFLUENZA VIRUS VAC: HCPCS | Mod: PBBFAC,PO

## 2022-01-25 PROCEDURE — 90694 VACC AIIV4 NO PRSRV 0.5ML IM: CPT | Mod: PBBFAC,PO

## 2022-01-25 PROCEDURE — 99214 PR OFFICE/OUTPT VISIT, EST, LEVL IV, 30-39 MIN: ICD-10-PCS | Mod: S$PBB,,, | Performed by: INTERNAL MEDICINE

## 2022-01-25 PROCEDURE — 99214 OFFICE O/P EST MOD 30 MIN: CPT | Mod: S$PBB,,, | Performed by: INTERNAL MEDICINE

## 2022-01-25 RX ORDER — HYDROCORTISONE 25 MG/G
CREAM TOPICAL 2 TIMES DAILY
Qty: 28 G | Refills: 1 | Status: SHIPPED | OUTPATIENT
Start: 2022-01-25

## 2022-02-14 ENCOUNTER — HOSPITAL ENCOUNTER (OUTPATIENT)
Dept: RADIOLOGY | Facility: HOSPITAL | Age: 77
Discharge: HOME | End: 2022-02-14
Attending: INTERNAL MEDICINE
Payer: MEDICARE

## 2022-02-14 DIAGNOSIS — Z12.31 VISIT FOR SCREENING MAMMOGRAM: ICD-10-CM

## 2022-02-14 PROCEDURE — 77063 BREAST TOMOSYNTHESIS BI: CPT | Mod: 26,,, | Performed by: INTERNAL MEDICINE

## 2022-02-14 PROCEDURE — 77063 MAMMO DIGITAL SCREENING BILAT WITH TOMO: ICD-10-PCS | Mod: 26,,, | Performed by: INTERNAL MEDICINE

## 2022-02-14 PROCEDURE — 77067 SCR MAMMO BI INCL CAD: CPT | Mod: 26,,, | Performed by: INTERNAL MEDICINE

## 2022-02-14 PROCEDURE — 77063 BREAST TOMOSYNTHESIS BI: CPT | Mod: TC,PO

## 2022-02-14 PROCEDURE — 77067 MAMMO DIGITAL SCREENING BILAT WITH TOMO: ICD-10-PCS | Mod: 26,,, | Performed by: INTERNAL MEDICINE

## 2022-03-25 ENCOUNTER — PES CALL (OUTPATIENT)
Dept: ADMINISTRATIVE | Facility: CLINIC | Age: 77
End: 2022-03-25
Payer: MEDICARE

## 2022-04-06 PROBLEM — E55.9 VITAMIN D INSUFFICIENCY: Status: ACTIVE | Noted: 2022-04-06

## 2022-04-06 PROBLEM — E78.5 HYPERLIPIDEMIA: Status: ACTIVE | Noted: 2022-04-06

## 2022-04-07 ENCOUNTER — OFFICE VISIT (OUTPATIENT)
Dept: INTERNAL MEDICINE | Facility: CLINIC | Age: 77
End: 2022-04-07
Payer: MEDICARE

## 2022-04-07 VITALS
HEART RATE: 82 BPM | OXYGEN SATURATION: 99 % | SYSTOLIC BLOOD PRESSURE: 120 MMHG | BODY MASS INDEX: 20.32 KG/M2 | HEIGHT: 64 IN | RESPIRATION RATE: 14 BRPM | DIASTOLIC BLOOD PRESSURE: 75 MMHG | WEIGHT: 119 LBS

## 2022-04-07 DIAGNOSIS — E55.9 VITAMIN D INSUFFICIENCY: ICD-10-CM

## 2022-04-07 DIAGNOSIS — M85.89 OSTEOPENIA OF MULTIPLE SITES: Primary | ICD-10-CM

## 2022-04-07 DIAGNOSIS — E78.5 HYPERLIPIDEMIA, UNSPECIFIED HYPERLIPIDEMIA TYPE: ICD-10-CM

## 2022-04-07 DIAGNOSIS — Z00.00 ENCOUNTER FOR PREVENTIVE HEALTH EXAMINATION: ICD-10-CM

## 2022-04-07 PROCEDURE — G0439 PR MEDICARE ANNUAL WELLNESS SUBSEQUENT VISIT: ICD-10-PCS | Mod: ,,, | Performed by: NURSE PRACTITIONER

## 2022-04-07 PROCEDURE — 99999 PR PBB SHADOW E&M-EST. PATIENT-LVL IV: CPT | Mod: PBBFAC,,, | Performed by: NURSE PRACTITIONER

## 2022-04-07 PROCEDURE — 99999 PR PBB SHADOW E&M-EST. PATIENT-LVL IV: ICD-10-PCS | Mod: PBBFAC,,, | Performed by: NURSE PRACTITIONER

## 2022-04-07 PROCEDURE — 99214 OFFICE O/P EST MOD 30 MIN: CPT | Mod: PBBFAC,PO | Performed by: NURSE PRACTITIONER

## 2022-04-07 PROCEDURE — G0439 PPPS, SUBSEQ VISIT: HCPCS | Mod: ,,, | Performed by: NURSE PRACTITIONER

## 2022-04-07 NOTE — PROGRESS NOTES
"Trista Lozano presented for a  Medicare AWV and comprehensive Health Risk Assessment today. The following components were reviewed and updated:    · Medical history  · Family History  · Social history  · Allergies and Current Medications  · Health Risk Assessment  · Health Maintenance  · Care Team external eye, derm, dentist    ** See Completed Assessments for Annual Wellness Visit within the encounter summary.**       The following assessments were completed:  · Living Situation  · CAGE  · Depression Screening  · Timed Get Up and Go  · Whisper Test  · Cognitive Function Screening  · Nutrition Screening  · ADL Screening  · PAQ Screening    Vitals:    04/07/22 0801   BP: 120/75   BP Location: Right arm   Pulse: 82   Resp: 14   SpO2: 99%   Weight: 54 kg (119 lb)   Height: 5' 4" (1.626 m)     Body mass index is 20.43 kg/m².  Physical Exam  Constitutional:       Appearance: She is well-developed.   HENT:      Head: Normocephalic and atraumatic.      Comments: Wears face mask     Right Ear: External ear normal.      Left Ear: External ear normal.   Cardiovascular:      Rate and Rhythm: Normal rate and regular rhythm.   Pulmonary:      Effort: No respiratory distress.      Breath sounds: Normal breath sounds.   Abdominal:      General: There is no distension.      Palpations: Abdomen is soft.   Musculoskeletal:         General: Normal range of motion.   Skin:     General: Skin is warm and dry.   Neurological:      Mental Status: She is alert and oriented to person, place, and time.      Motor: No abnormal muscle tone.      Deep Tendon Reflexes: Reflexes are normal and symmetric.   Psychiatric:         Mood and Affect: Mood normal.         Behavior: Behavior normal.         Thought Content: Thought content normal.         Judgment: Judgment normal.           Lab Results   Component Value Date    CHOL 192 01/21/2022    HDL 68 01/21/2022    LDLCALC 114.8 01/21/2022    TRIG 46 01/21/2022    CHOLHDL 35.4 01/21/2022      Results for " orders placed in visit on 10/13/20    DXA Bone Density Spine And Hip    Narrative  EXAMINATION:  DEXA BONE DENSITY SPINE HIP    CLINICAL HISTORY:  Asymptomatic menopausal state.  76 y/o female with no history of fractures.  She had menopausal symptoms at 50 y/o.  Pt's Mother had a hip and wrist fractures.  Pt is taking Calcium, Vit D and Fosamax.  She does not exercise or smoke.    TECHNIQUE:  DXA specification: Inf[employer] Horizon [id number]    Bone Mineral Density scanning was performed over the hip and lumbar spine.    Review of the images confirms satisfactory positioning and technique.    COMPARISON:  Comparison study done on 09/06/2018.    Lumbar spine:  BMD 0.862 G/cm2 and T-score -1.7.    Total Hip:        BMD 0.863 g/cm2 and T-score -0.6.    FINDINGS:  Lumbar spine (L1-L4):              BMD is 0.865 g/cm2, T-score is -1.7, and Z-score is 0.8.    Total hip:                               BMD is 0.911 g/cm2, T-score is -0.3, and Z-score is 1.6.    Femoral neck:                         BMD is 0.736 g/cm2, T-score is -1.0, and Z-score is 1.6.    FRAX:    14% risk of a major osteoporotic fracture in the next 10 years.    6.6% risk of hip fracture in the next 10 years.    Impression  1. Osteopenia of the lumbar spine.  FRAX calculations suggest treatment.  2. Compared with previous DXA, BMD at the lumbar spine has remained stable, and the BMD at the total hip has increased by 5.6%.    RECOMMENDATIONS:  RECOMMENDATIONS of [facility]:    1. Recommend daily calcium intake 1419-9168 mg, dietary sources preferred; Vitamin D 4241-9352 IU daily.  2. Adequate exercise and fall precautions.  3. Confirm compliance with dosing and administration of bisphosphonate.  Consider continuing bisphosphonate versus alternative therapy.  4. Repeat BMD in 2 years.    EXPLANATION OF RESULTS:  The T-score compares these results to the average bone density of a 20-29 year-old of the same gender.  The Z-score compares this result to the average bone density to people of the same age, gender, and race. The amounts indicate the number of standard deviations above or below the mean.    * Osteoporosis is generally defined as having a T-score between less than or equal to -2.5.    * Osteopenia is generally defined as having a T-score between -1.0 and -2.5.    * The normal range is generally defined as having a T-score greater than or equal to -1.0.    * Calculated FRAX scores for fracture risk prediction may not be accurate in the setting of certain clinical factors such as pharmacologic therapy for osteoporosis, prior fragility fractures, high dose glucocorticoid use.      Electronically signed by: Pauline Fleming MD  Date:    10/20/2020  Time:    08:33    Results for orders placed during the hospital encounter of 02/25/15    Mammo Digital Screening Bilateral With CAD    Narrative  The present examination has been compared to prior imaging studies dated  12/09/2013, 11/27/2012 and 01/20/2011.    BILATERAL MAMMOGRAM FINDINGS:  The breast tissue is heterogeneously dense.  This may lower the sensitivity of  mammography.    There is a focal asymmetry seen in the left breast at 12 o'clock. Finding  remains unchanged from the prior study.    These images  were processed to produce digital images analyzed for potential  abnormalities.    IMPRESSION:    Focal asymmetry in the left breast requires additional evaluation.  Spot  compression is recommended.    ACR BI-RADS Category 0: Incomplete: Need Additional Imaging Evaluation      BOSTON WETZEL M.D.  02/25/2015                Diagnoses and health risks identified today and associated recommendations/orders:    1. Hyperlipidemia, unspecified hyperlipidemia type  Stable- followed by PCP    2. Vitamin D insufficiency  Stable- followed by PCP      3. Osteopenia of multiple sites  Stable- followed by PCP      4. Encounter for preventive health examination  Here for  Health Risk Assessment/Annual Wellness Visit.  Health maintenance reviewed and updated. Follow up in one year.         Provided Trista with a 5-10 year written screening schedule and personal prevention plan. Recommendations were developed using the USPSTF age appropriate recommendations. Education, counseling, and referrals were provided as needed. After Visit Summary printed and given to patient which includes a list of additional screenings\tests needed.Sit & Be fit on TV-handouts. Off fosamax- DXA this fall. On ca & Vit d, fall prevention, walks.Colonoscopy- declined-plans to Discuss w PCP. Fall prevention handouts.     Follow up in about 1 year (around 4/7/2023).    Farrah Mcdonald NP I offered to discuss advanced care planning, including how to pick a person who would make decisions for you if you were unable to make them for yourself, called a health care power of , and what kind of decisions you might make such as use of life sustaining treatments such as ventilators and tube feeding when faced with a life limiting illness recorded on a living will that they will need to know. (How you want to be cared for as you near the end of your natural life)     X  Patient has advanced directives written and agrees to provide copies to the institution.

## 2022-04-07 NOTE — PATIENT INSTRUCTIONS
Counseling and Referral of Other Preventative  (Italic type indicates deductible and co-insurance are waived)    Patient Name: Trista Lozano  Today's Date: 4/7/2022    Health Maintenance       Date Due Completion Date    COVID-19 Vaccine (4 - Booster for Pfizer series) Check w PCP prior to getting   10/12/2021    Colonoscopy Postponed- plans to D/W PCP   4/20/2016    Shingles Vaccine (2 of 2) 05/09/2022   3/14/2022    DEXA Scan 10/13/2022 10/13/2020        Lipid Panel 01/21/2023 1/21/2022    Mammogram 02/14/2023 2/14/2022    TETANUS VACCINE 02/25/2025 2/25/2015        No orders of the defined types were placed in this encounter.    The following information is provided to all patients.  This information is to help you find resources for any of the problems found today that may be affecting your health:                Living healthy guide: www.[facility].[place].[facility]      Understanding Diabetes: www.diabetes.org      Eating healthy: www.cdc.gov/healthyweight      CDC home safety checklist: www.cdc.gov/steadi/patient.html      Agency on Aging: www.goea.[place].[facility]      Alcoholics anonymous (AA): www.aa.org      Physical Activity: www.socrates.nih.gov/mj3gbnz      Tobacco use: www.quitwithusla.org

## 2022-07-25 ENCOUNTER — TELEPHONE (OUTPATIENT)
Dept: INTERNAL MEDICINE | Facility: CLINIC | Age: 77
End: 2022-07-25
Payer: MEDICARE

## 2022-07-25 DIAGNOSIS — Z00.00 ANNUAL PHYSICAL EXAM: ICD-10-CM

## 2022-07-25 DIAGNOSIS — E78.5 HYPERLIPIDEMIA, UNSPECIFIED HYPERLIPIDEMIA TYPE: Primary | ICD-10-CM

## 2022-07-25 NOTE — TELEPHONE ENCOUNTER
----- Message from Hernandez Holley sent at 7/25/2022  8:40 AM CDT -----  Good Morning,  Patient: WALLY ZI BRENT [[id number]] has an annual visit scheduled for: 1/26/23, her labs are scheduled for: 1/19/23, please link lab orders. Thank you

## 2022-09-14 ENCOUNTER — LAB VISIT (OUTPATIENT)
Dept: LAB | Facility: HOSPITAL | Age: 77
End: 2022-09-14
Attending: INTERNAL MEDICINE
Payer: MEDICARE

## 2022-09-14 ENCOUNTER — TELEPHONE (OUTPATIENT)
Dept: INTERNAL MEDICINE | Facility: CLINIC | Age: 77
End: 2022-09-14
Payer: MEDICARE

## 2022-09-14 DIAGNOSIS — R35.0 URINARY FREQUENCY: ICD-10-CM

## 2022-09-14 DIAGNOSIS — R39.15 URINARY URGENCY: ICD-10-CM

## 2022-09-14 DIAGNOSIS — R39.15 URINARY URGENCY: Primary | ICD-10-CM

## 2022-09-14 LAB
BACTERIA #/AREA URNS AUTO: ABNORMAL /HPF
BILIRUB UR QL STRIP: NEGATIVE
CLARITY UR REFRACT.AUTO: ABNORMAL
COLOR UR AUTO: YELLOW
GLUCOSE UR QL STRIP: NEGATIVE
HGB UR QL STRIP: NEGATIVE
KETONES UR QL STRIP: NEGATIVE
LEUKOCYTE ESTERASE UR QL STRIP: ABNORMAL
MICROSCOPIC COMMENT: ABNORMAL
NITRITE UR QL STRIP: POSITIVE
PH UR STRIP: 7 [PH] (ref 5–8)
PROT UR QL STRIP: ABNORMAL
RBC #/AREA URNS AUTO: 16 /HPF (ref 0–4)
SP GR UR STRIP: 1.03 (ref 1–1.03)
SQUAMOUS #/AREA URNS AUTO: 1 /HPF
URN SPEC COLLECT METH UR: ABNORMAL
WBC #/AREA URNS AUTO: >100 /HPF (ref 0–5)

## 2022-09-14 PROCEDURE — 87186 SC STD MICRODIL/AGAR DIL: CPT | Performed by: INTERNAL MEDICINE

## 2022-09-14 PROCEDURE — 87077 CULTURE AEROBIC IDENTIFY: CPT | Performed by: INTERNAL MEDICINE

## 2022-09-14 PROCEDURE — 87086 URINE CULTURE/COLONY COUNT: CPT | Performed by: INTERNAL MEDICINE

## 2022-09-14 PROCEDURE — 81001 URINALYSIS AUTO W/SCOPE: CPT | Performed by: INTERNAL MEDICINE

## 2022-09-14 PROCEDURE — 87088 URINE BACTERIA CULTURE: CPT | Performed by: INTERNAL MEDICINE

## 2022-09-14 RX ORDER — NITROFURANTOIN 25; 75 MG/1; MG/1
100 CAPSULE ORAL 2 TIMES DAILY
Qty: 10 CAPSULE | Refills: 0 | Status: SHIPPED | OUTPATIENT
Start: 2022-09-14 | End: 2022-11-29 | Stop reason: SDUPTHER

## 2022-09-14 NOTE — TELEPHONE ENCOUNTER
----- Message from Neena Castillo sent at 9/14/2022  8:56 AM CDT -----  Contact: pt [phone]  Pt is requesting something be called in for a UTI. Per pt, she is using   [id number] - [place], [place] - [address]  [address] [place] [address]  Phone: [phone] Fax: [phone]          Thank you

## 2022-09-14 NOTE — TELEPHONE ENCOUNTER
Spoke to pt. Pt stated that she is certain she has a UTI, because she has had them before with the same symptoms. Pt c/o urinary urgency and frequency at night.  Pt advised that we will need a urine specimen.   Pt ok with this.  U/a and urine culture ordered and scheduled for this morning. Pt on her way there now.    Pt requesting that something be called in to her pharmacy for this.  Pharmacy queued.

## 2022-09-17 LAB — BACTERIA UR CULT: ABNORMAL

## 2022-11-02 ENCOUNTER — IMMUNIZATION (OUTPATIENT)
Dept: PRIMARY CARE CLINIC | Facility: CLINIC | Age: 77
End: 2022-11-02
Payer: MEDICARE

## 2022-11-02 DIAGNOSIS — Z23 NEED FOR VACCINATION: Primary | ICD-10-CM

## 2022-11-02 PROCEDURE — 91312 COVID-19, MRNA, LNP-S, BIVALENT BOOSTER, PF, 30 MCG/0.3 ML DOSE: CPT | Mod: PBBFAC | Performed by: INTERNAL MEDICINE

## 2022-11-02 PROCEDURE — 0124A COVID-19, MRNA, LNP-S, BIVALENT BOOSTER, PF, 30 MCG/0.3 ML DOSE: CPT | Mod: CV19,PBBFAC | Performed by: INTERNAL MEDICINE

## 2022-11-29 ENCOUNTER — TELEPHONE (OUTPATIENT)
Dept: INTERNAL MEDICINE | Facility: CLINIC | Age: 77
End: 2022-11-29
Payer: MEDICARE

## 2022-11-29 ENCOUNTER — OFFICE VISIT (OUTPATIENT)
Dept: URGENT CARE | Facility: CLINIC | Age: 77
End: 2022-11-29
Payer: MEDICARE

## 2022-11-29 VITALS
WEIGHT: 119 LBS | HEIGHT: 64 IN | OXYGEN SATURATION: 98 % | RESPIRATION RATE: 19 BRPM | HEART RATE: 85 BPM | TEMPERATURE: 99 F | SYSTOLIC BLOOD PRESSURE: 124 MMHG | BODY MASS INDEX: 20.32 KG/M2 | DIASTOLIC BLOOD PRESSURE: 71 MMHG

## 2022-11-29 DIAGNOSIS — R05.9 COUGH, UNSPECIFIED TYPE: ICD-10-CM

## 2022-11-29 DIAGNOSIS — N30.00 ACUTE CYSTITIS WITHOUT HEMATURIA: Primary | ICD-10-CM

## 2022-11-29 DIAGNOSIS — J10.1 INFLUENZA A: ICD-10-CM

## 2022-11-29 DIAGNOSIS — R30.0 BURNING WITH URINATION: ICD-10-CM

## 2022-11-29 LAB
BILIRUB UR QL STRIP: NEGATIVE
CTP QC/QA: YES
GLUCOSE UR QL STRIP: NEGATIVE
KETONES UR QL STRIP: NEGATIVE
LEUKOCYTE ESTERASE UR QL STRIP: POSITIVE
PH, POC UA: 6 (ref 5–8)
POC BLOOD, URINE: POSITIVE
POC MOLECULAR INFLUENZA A AGN: POSITIVE
POC MOLECULAR INFLUENZA B AGN: NEGATIVE
POC NITRATES, URINE: NEGATIVE
PROT UR QL STRIP: POSITIVE
SP GR UR STRIP: 1.01 (ref 1–1.03)
UROBILINOGEN UR STRIP-ACNC: ABNORMAL (ref 0.1–1.1)

## 2022-11-29 PROCEDURE — 87502 POCT INFLUENZA A/B MOLECULAR: ICD-10-PCS | Mod: QW,S$GLB,, | Performed by: PHYSICIAN ASSISTANT

## 2022-11-29 PROCEDURE — 99213 OFFICE O/P EST LOW 20 MIN: CPT | Mod: S$GLB,,, | Performed by: PHYSICIAN ASSISTANT

## 2022-11-29 PROCEDURE — 87088 URINE BACTERIA CULTURE: CPT | Performed by: PHYSICIAN ASSISTANT

## 2022-11-29 PROCEDURE — 81003 POCT URINALYSIS, DIPSTICK, AUTOMATED, W/O SCOPE: ICD-10-PCS | Mod: QW,S$GLB,, | Performed by: PHYSICIAN ASSISTANT

## 2022-11-29 PROCEDURE — 87502 INFLUENZA DNA AMP PROBE: CPT | Mod: QW,S$GLB,, | Performed by: PHYSICIAN ASSISTANT

## 2022-11-29 PROCEDURE — 87186 SC STD MICRODIL/AGAR DIL: CPT | Performed by: PHYSICIAN ASSISTANT

## 2022-11-29 PROCEDURE — 99213 PR OFFICE/OUTPT VISIT, EST, LEVL III, 20-29 MIN: ICD-10-PCS | Mod: S$GLB,,, | Performed by: PHYSICIAN ASSISTANT

## 2022-11-29 PROCEDURE — 87086 URINE CULTURE/COLONY COUNT: CPT | Performed by: PHYSICIAN ASSISTANT

## 2022-11-29 PROCEDURE — 87077 CULTURE AEROBIC IDENTIFY: CPT | Performed by: PHYSICIAN ASSISTANT

## 2022-11-29 PROCEDURE — 81003 URINALYSIS AUTO W/O SCOPE: CPT | Mod: QW,S$GLB,, | Performed by: PHYSICIAN ASSISTANT

## 2022-11-29 RX ORDER — NITROFURANTOIN 25; 75 MG/1; MG/1
100 CAPSULE ORAL 2 TIMES DAILY
Qty: 10 CAPSULE | Refills: 0 | Status: SHIPPED | OUTPATIENT
Start: 2022-11-29 | End: 2023-11-21 | Stop reason: ALTCHOICE

## 2022-11-29 RX ORDER — BENZONATATE 200 MG/1
200 CAPSULE ORAL 3 TIMES DAILY PRN
Qty: 30 CAPSULE | Refills: 0 | Status: SHIPPED | OUTPATIENT
Start: 2022-11-29 | End: 2022-12-09

## 2022-11-29 RX ORDER — FLUTICASONE PROPIONATE 50 MCG
1 SPRAY, SUSPENSION (ML) NASAL DAILY
Qty: 16 G | Refills: 0 | Status: SHIPPED | OUTPATIENT
Start: 2022-11-29 | End: 2022-12-06

## 2022-11-29 NOTE — TELEPHONE ENCOUNTER
Spoke to pt , pt states she went to urgent care she have a UTI and a sinus infection , pt states urgent care prescribed medication for her.

## 2022-11-29 NOTE — PATIENT INSTRUCTIONS
If your condition worsens or fails to improve we recommend that you receive another evaluation at the ER immediately or contact your PCP to discuss your concerns or return here. You must understand that you've received an urgent care treatment only and that you may be released before all your medical problems are known or treated. You the patient will arrange for followup care as instructed.   If you had cultures done it will take 3-5 days to result. We will call you with the result.   If you are are female and on BCP use additional methods to prevent pregnancy while on the antibiotics and for one cycle after.   Cranberry juice may help. Get the 100% cranberry juice and mix 4 oz of juice with 4 oz of water and drink this 8 oz glass of liquid once a day.   Increase water intake to at least 8-10 glasses/day.  Take antibiotics as prescribed.   Avoid caffeine, alcohol, or spicy foods as they irritate the bladder.

## 2022-11-29 NOTE — TELEPHONE ENCOUNTER
----- Message from Shanika Redd sent at 11/29/2022  8:30 AM CST -----  Contact: Self/[phone]  [email or web address]     Patient is calling for Medical Advice regarding: coughing/congested/not feeling well    How long has patient had these symptoms: 4 days     [id number] [place] - [address]  [address] [place] [address]  Phone: [phone] Fax: [phone]        Would like response via Neogrowtht:  No     Comments:

## 2022-11-29 NOTE — PROGRESS NOTES
"Subjective:       Patient ID: Trista Lozano is a 77 y.o. female.    Vitals:  height is 5' 4" (1.626 m) and weight is 54 kg (119 lb). Her temperature is 98.6 °F (37 °C). Her blood pressure is 124/71 and her pulse is 85. Her respiration is 19 and oxygen saturation is 98%.     Chief Complaint: Cough and Urinary Tract Infection    Pt present with symptoms of Congestion , Cough and generally doesn't feel good. Pt also stated she feel like she is developing symptoms of a UTI. These symptoms started X 3 days.     Cough  This is a new problem. The current episode started in the past 7 days. The problem has been gradually worsening. The problem occurs every few minutes. The cough is Non-productive. Associated symptoms include chills, nasal congestion, postnasal drip and a sore throat. Pertinent negatives include no chest pain, ear congestion, ear pain, fever, headaches, myalgias, rash or shortness of breath. Nothing aggravates the symptoms. Treatments tried: Pt stated she has tried Clariton with no relief. The treatment provided no relief. There is no history of asthma, bronchitis or COPD.   Urinary Tract Infection   This is a new problem. The current episode started gradual onset. The problem occurs every urination. The problem has been gradually worsening. The quality of the pain is described as burning. The pain is at a severity of 6/10. The pain is moderate. There has been no fever. She is Sexually active. Associated symptoms include chills, frequency and urgency. Pertinent negatives include no discharge, flank pain, nausea, vomiting, constipation or rash. She has tried nothing for the symptoms. The treatment provided no relief. There is no history of kidney stones.     Constitution: Positive for chills and fatigue. Negative for appetite change, sweating and fever.   HENT:  Positive for congestion, postnasal drip and sore throat. Negative for ear pain, ear discharge, mouth sores, tongue pain, tongue lesion, sinus pain, " sinus pressure and trouble swallowing.    Neck: Negative for neck pain, neck stiffness and painful lymph nodes.   Cardiovascular:  Negative for chest pain and sob on exertion.   Eyes:  Negative for eye discharge and eye itching.   Respiratory:  Positive for cough. Negative for chest tightness, sputum production and shortness of breath.    Gastrointestinal:  Negative for abdominal pain, nausea, vomiting, constipation and diarrhea.   Genitourinary:  Positive for dysuria, frequency, urgency and urine decreased. Negative for flank pain and bladder incontinence.   Musculoskeletal:  Negative for pain, muscle cramps and muscle ache.   Skin:  Negative for color change, pale and rash.   Neurological:  Negative for dizziness, headaches, disorientation and altered mental status.   Hematologic/Lymphatic: Negative for swollen lymph nodes.   Psychiatric/Behavioral:  Negative for altered mental status and disorientation.    Past Medical History:   Diagnosis Date    Menarche     Age of onset 12       Past Surgical History:   Procedure Laterality Date    COLONOSCOPY N/A 4/20/2016    Procedure: COLONOSCOPY;  Surgeon: Ellis Fry MD;  Location: [address];  Service: Endoscopy;  Laterality: N/A;  Pt request Prepopik/covered by insurance/Moviprep is not    NO PAST SURGERIES         Family History   Problem Relation Age of Onset    Breast cancer Neg Hx     Colon cancer Neg Hx     Ovarian cancer Neg Hx     Hypertension Neg Hx     Diabetes Neg Hx        Social History     Socioeconomic History    Marital status:    Tobacco Use    Smoking status: Never    Smokeless tobacco: Never   Substance and Sexual Activity    Alcohol use: Yes     Comment: Socially/ few drinks (2-3) every week    Drug use: No    Sexual activity: Yes     Birth control/protection: Post-menopausal     Social Determinants of Health     Financial Resource Strain: Low Risk     Difficulty of Paying Living Expenses: Not hard at all   Food Insecurity: No  Food Insecurity    Worried About Running Out of Food in the Last Year: Never true    Ran Out of Food in the Last Year: Never true   Transportation Needs: No Transportation Needs    Lack of Transportation (Medical): No    Lack of Transportation (Non-Medical): No   Stress: No Stress Concern Present    Feeling of Stress : Only a little   Housing Stability: Unknown    Unable to Pay for Housing in the Last Year: No    Unstable Housing in the Last Year: No       Current Outpatient Medications   Medication Sig Dispense Refill    acetaminophen (TYLENOL ARTHRITIS ORAL) Take by mouth.      calcium-vitamin D3 (OS-KELI 500 + D3) 500 mg-5 mcg (200 unit) per tablet Take 1 tablet by mouth 2 (two) times daily with meals.      multivit-min-iron-FA-lutein (CENTRUM SILVER WOMEN) 8 mg iron-400 mcg-300 mcg Tab Take by mouth.      omega-3 fatty acids/fish oil (FISH OIL-OMEGA-3 FATTY ACIDS) 300-1,000 mg capsule Take by mouth once daily.      vitamin D (VITAMIN D3) 1000 units Tab Take 1,000 Units by mouth once daily.      alendronate (FOSAMAX) 70 MG tablet TAKE 1 TABLET ONCE WEEKLY IN THE MORNING WITH A FULL GLASS OF WATER ON AN EMPTY STOMACH. (Patient not taking: Reported on 11/29/2022) 12 tablet 3    benzonatate (TESSALON) 200 MG capsule Take 1 capsule (200 mg total) by mouth 3 (three) times daily as needed for Cough. 30 capsule 0    fluticasone propionate (FLONASE) 50 mcg/actuation nasal spray 1 spray (50 mcg total) by Each Nostril route once daily. for 7 days 16 g 0    hydrocortisone 2.5 % cream Apply topically 2 (two) times daily. (Patient not taking: Reported on 4/7/2022) 28 g 1    nitrofurantoin, macrocrystal-monohydrate, (MACROBID) 100 MG capsule Take 1 capsule (100 mg total) by mouth 2 (two) times daily. 10 capsule 0    varicella-zoster [id number], PF, (SHINGRIX, PF,) 50 mcg/0.5 mL injection Inject into the muscle. 1 each 0     No current facility-administered medications for this visit.       Review of patient's allergies  indicates:  No Known Allergies     Objective:      Physical Exam   Constitutional: She is oriented to person, place, and time. She appears well-developed. She is cooperative.  Non-toxic appearance. She does not appear ill. No distress.   HENT:   Head: Normocephalic and atraumatic.   Ears:   Right Ear: Hearing, external ear and ear canal normal. Tympanic membrane is not erythematous and not bulging. A middle ear effusion is present. impacted cerumen  Left Ear: Hearing, external ear and ear canal normal. Tympanic membrane is not erythematous and not bulging. A middle ear effusion is present. impacted cerumen  Nose: Rhinorrhea and congestion present. No mucosal edema or nasal deformity. No epistaxis. Right sinus exhibits no maxillary sinus tenderness and no frontal sinus tenderness. Left sinus exhibits no maxillary sinus tenderness and no frontal sinus tenderness.   Mouth/Throat: Uvula is midline and mucous membranes are normal. Mucous membranes are moist. No trismus in the jaw. Normal dentition. No uvula swelling. Posterior oropharyngeal erythema present. No oropharyngeal exudate or posterior oropharyngeal edema.   Eyes: Conjunctivae and lids are normal. Right eye exhibits no discharge. Left eye exhibits no discharge. No scleral icterus.   Neck: Trachea normal and phonation normal. Neck supple. Carotid bruit is not present. No edema present. No erythema present. No neck rigidity present.   Cardiovascular: Normal rate, regular rhythm, normal heart sounds and normal pulses.   No murmur heard.Exam reveals no gallop.   Pulmonary/Chest: Effort normal and breath sounds normal. No stridor. No respiratory distress. She has no decreased breath sounds. She has no wheezes. She has no rhonchi. She has no rales.   Abdominal: Normal appearance and bowel sounds are normal. She exhibits no distension and no mass. Soft. flat abdomen There is no abdominal tenderness. There is no rebound, no guarding, no left CVA tenderness and no right  CVA tenderness. No hernia.   Lymphadenopathy:     She has no cervical adenopathy.   Neurological: She is alert and oriented to person, place, and time. She exhibits normal muscle tone. Coordination normal.   Skin: Skin is warm, dry, intact, not diaphoretic and not pale.   Psychiatric: Her speech is normal and behavior is normal. Judgment and thought content normal.   Nursing note and vitals reviewed.  Results for orders placed or performed in visit on 11/29/22   POCT Influenza A/B MOLECULAR   Result Value Ref Range    POC Molecular Influenza A Ag Positive (A) Negative, Not Reported    POC Molecular Influenza B Ag Negative Negative, Not Reported     Acceptable Yes    POCT Urinalysis, Dipstick, Automated, W/O Scope   Result Value Ref Range    POC Blood, Urine Positive (A) Negative    POC Bilirubin, Urine Negative Negative    POC Urobilinogen, Urine Norm 0.1 - 1.1    POC Ketones, Urine Negative Negative    POC Protein, Urine Positive (A) Negative    POC Nitrates, Urine Negative Negative    POC Glucose, Urine Negative Negative    pH, UA 6.0 5 - 8    POC Specific Gravity, Urine 1.015 1.003 - 1.029    POC Leukocytes, Urine Positive (A) Negative           Assessment:       1. Acute cystitis without hematuria    2. Cough, unspecified type    3. Burning with urination    4. Influenza A            Plan:       Results reviewed  Acute cystitis without hematuria  -     nitrofurantoin, macrocrystal-monohydrate, (MACROBID) 100 MG capsule; Take 1 capsule (100 mg total) by mouth 2 (two) times daily.  Dispense: 10 capsule; Refill: 0    Cough, unspecified type  -     POCT Influenza A/B MOLECULAR  -     benzonatate (TESSALON) 200 MG capsule; Take 1 capsule (200 mg total) by mouth 3 (three) times daily as needed for Cough.  Dispense: 30 capsule; Refill: 0  -     fluticasone propionate (FLONASE) 50 mcg/actuation nasal spray; 1 spray (50 mcg total) by Each Nostril route once daily. for 7 days  Dispense: 16 g; Refill:  0    Burning with urination  -     POCT Urinalysis, Dipstick, Automated, W/O Scope  -     CULTURE, URINE    Influenza A  -     benzonatate (TESSALON) 200 MG capsule; Take 1 capsule (200 mg total) by mouth 3 (three) times daily as needed for Cough.  Dispense: 30 capsule; Refill: 0  -     fluticasone propionate (FLONASE) 50 mcg/actuation nasal spray; 1 spray (50 mcg total) by Each Nostril route once daily. for 7 days  Dispense: 16 g; Refill: 0       Patient Instructions   If your condition worsens or fails to improve we recommend that you receive another evaluation at the ER immediately or contact your PCP to discuss your concerns or return here. You must understand that you've received an urgent care treatment only and that you may be released before all your medical problems are known or treated. You the patient will arrange for followup care as instructed.   If you had cultures done it will take 3-5 days to result. We will call you with the result.   If you are are female and on BCP use additional methods to prevent pregnancy while on the antibiotics and for one cycle after.   Cranberry juice may help. Get the 100% cranberry juice and mix 4 oz of juice with 4 oz of water and drink this 8 oz glass of liquid once a day.   Increase water intake to at least 8-10 glasses/day.  Take antibiotics as prescribed.   Avoid caffeine, alcohol, or spicy foods as they irritate the bladder.

## 2022-12-03 LAB — BACTERIA UR CULT: ABNORMAL

## 2022-12-13 ENCOUNTER — TELEPHONE (OUTPATIENT)
Dept: URGENT CARE | Facility: CLINIC | Age: 77
End: 2022-12-13
Payer: MEDICARE

## 2023-01-19 ENCOUNTER — LAB VISIT (OUTPATIENT)
Dept: LAB | Facility: HOSPITAL | Age: 78
End: 2023-01-19
Payer: MEDICARE

## 2023-01-19 DIAGNOSIS — Z00.00 ANNUAL PHYSICAL EXAM: ICD-10-CM

## 2023-01-19 DIAGNOSIS — E78.5 HYPERLIPIDEMIA, UNSPECIFIED HYPERLIPIDEMIA TYPE: ICD-10-CM

## 2023-01-19 LAB
ALBUMIN SERPL BCP-MCNC: 3.9 G/DL (ref 3.5–5.2)
ALP SERPL-CCNC: 76 U/L (ref 55–135)
ALT SERPL W/O P-5'-P-CCNC: 19 U/L (ref 10–44)
ANION GAP SERPL CALC-SCNC: 8 MMOL/L (ref 8–16)
AST SERPL-CCNC: 22 U/L (ref 10–40)
BASOPHILS # BLD AUTO: 0.08 K/UL (ref 0–0.2)
BASOPHILS NFR BLD: 1.3 % (ref 0–1.9)
BILIRUB SERPL-MCNC: 0.6 MG/DL (ref 0.1–1)
BUN SERPL-MCNC: 12 MG/DL (ref 8–23)
CALCIUM SERPL-MCNC: 9.6 MG/DL (ref 8.7–10.5)
CHLORIDE SERPL-SCNC: 105 MMOL/L (ref 95–110)
CHOLEST SERPL-MCNC: 210 MG/DL (ref 120–199)
CHOLEST/HDLC SERPL: 3 {RATIO} (ref 2–5)
CO2 SERPL-SCNC: 27 MMOL/L (ref 23–29)
CREAT SERPL-MCNC: 0.8 MG/DL (ref 0.5–1.4)
DIFFERENTIAL METHOD: ABNORMAL
EOSINOPHIL # BLD AUTO: 0.2 K/UL (ref 0–0.5)
EOSINOPHIL NFR BLD: 3.1 % (ref 0–8)
ERYTHROCYTE [DISTWIDTH] IN BLOOD BY AUTOMATED COUNT: 13.6 % (ref 11.5–14.5)
EST. GFR  (NO RACE VARIABLE): >60 ML/MIN/1.73 M^2
GLUCOSE SERPL-MCNC: 95 MG/DL (ref 70–110)
HCT VFR BLD AUTO: 45.6 % (ref 37–48.5)
HDLC SERPL-MCNC: 70 MG/DL (ref 40–75)
HDLC SERPL: 33.3 % (ref 20–50)
HGB BLD-MCNC: 14.5 G/DL (ref 12–16)
IMM GRANULOCYTES # BLD AUTO: 0.01 K/UL (ref 0–0.04)
IMM GRANULOCYTES NFR BLD AUTO: 0.2 % (ref 0–0.5)
LDLC SERPL CALC-MCNC: 126.6 MG/DL (ref 63–159)
LYMPHOCYTES # BLD AUTO: 2.4 K/UL (ref 1–4.8)
LYMPHOCYTES NFR BLD: 38.8 % (ref 18–48)
MCH RBC QN AUTO: 30.9 PG (ref 27–31)
MCHC RBC AUTO-ENTMCNC: 31.8 G/DL (ref 32–36)
MCV RBC AUTO: 97 FL (ref 82–98)
MONOCYTES # BLD AUTO: 0.5 K/UL (ref 0.3–1)
MONOCYTES NFR BLD: 8.1 % (ref 4–15)
NEUTROPHILS # BLD AUTO: 3 K/UL (ref 1.8–7.7)
NEUTROPHILS NFR BLD: 48.5 % (ref 38–73)
NONHDLC SERPL-MCNC: 140 MG/DL
NRBC BLD-RTO: 0 /100 WBC
PLATELET # BLD AUTO: 244 K/UL (ref 150–450)
PMV BLD AUTO: 11.7 FL (ref 9.2–12.9)
POTASSIUM SERPL-SCNC: 4.6 MMOL/L (ref 3.5–5.1)
PROT SERPL-MCNC: 6.7 G/DL (ref 6–8.4)
RBC # BLD AUTO: 4.7 M/UL (ref 4–5.4)
SODIUM SERPL-SCNC: 140 MMOL/L (ref 136–145)
TRIGL SERPL-MCNC: 67 MG/DL (ref 30–150)
TSH SERPL DL<=0.005 MIU/L-ACNC: 1.03 UIU/ML (ref 0.4–4)
WBC # BLD AUTO: 6.21 K/UL (ref 3.9–12.7)

## 2023-01-19 PROCEDURE — 85025 COMPLETE CBC W/AUTO DIFF WBC: CPT | Performed by: INTERNAL MEDICINE

## 2023-01-19 PROCEDURE — 80061 LIPID PANEL: CPT | Performed by: INTERNAL MEDICINE

## 2023-01-19 PROCEDURE — 84443 ASSAY THYROID STIM HORMONE: CPT | Performed by: INTERNAL MEDICINE

## 2023-01-19 PROCEDURE — 36415 COLL VENOUS BLD VENIPUNCTURE: CPT | Mod: PO | Performed by: INTERNAL MEDICINE

## 2023-01-19 PROCEDURE — 80053 COMPREHEN METABOLIC PANEL: CPT | Performed by: INTERNAL MEDICINE

## 2023-01-19 NOTE — PROGRESS NOTES
Subjective:       Patient ID: Trista Lozano is a 77 y.o. female.    Chief Complaint: Annual Exam    Patient is a 77 y.o.female who presents today for annual  Cholesterol: (reviewed)  Vaccines: Influenza (yearly); Tetanus (2015) Pneumovax (2014); Zoster (declines)  Eye exam: up to date  Mammogram: due now  Gyn exam: dr. wilhelm  Colonoscopy: 2016; repeat in five years; doesn't want to do it again; declines stool card  DEXA: 2020; off fosamax; dentist prefers her not to be on fosamax; she had a tooth infection which needed an implant; she is calcium on vitamin D; off fosamax for about six months. ; due in october  Review of Systems   Constitutional:  Negative for appetite change, chills, diaphoresis and fever.   HENT:  Negative for congestion, ear discharge, ear pain, postnasal drip, tinnitus, trouble swallowing and voice change.    Eyes:  Negative for discharge, redness and itching.   Respiratory:  Negative for cough, chest tightness, shortness of breath and wheezing.    Cardiovascular:  Negative for chest pain, palpitations and leg swelling.   Gastrointestinal:  Negative for abdominal pain, constipation, diarrhea, nausea and vomiting.   Endocrine: Negative for cold intolerance and heat intolerance.   Genitourinary:  Negative for difficulty urinating, flank pain, hematuria and urgency.   Musculoskeletal:  Negative for arthralgias, gait problem, myalgias and neck stiffness.   Skin:  Negative for color change and rash.   Neurological:  Negative for dizziness, seizures, syncope and headaches.   Hematological:  Negative for adenopathy.   Psychiatric/Behavioral:  Negative for agitation, behavioral problems, confusion and sleep disturbance.      Objective:      Physical Exam  Vitals and nursing note reviewed.   Constitutional:       General: She is not in acute distress.     Appearance: She is well-developed. She is not diaphoretic.   HENT:      Head: Normocephalic and atraumatic.      Right Ear: External ear normal.      Left  Ear: External ear normal.      Nose: Nose normal.      Mouth/Throat:      Pharynx: No oropharyngeal exudate.   Eyes:      General: No scleral icterus.        Right eye: No discharge.         Left eye: No discharge.      Conjunctiva/sclera: Conjunctivae normal.      Pupils: Pupils are equal, round, and reactive to light.   Neck:      Thyroid: No thyromegaly.      Vascular: No JVD.      Trachea: No tracheal deviation.   Cardiovascular:      Rate and Rhythm: Normal rate.      Heart sounds: Normal heart sounds. No murmur heard.    No friction rub. No gallop.   Pulmonary:      Effort: Pulmonary effort is normal. No respiratory distress.      Breath sounds: Normal breath sounds. No stridor. No wheezing or rales.   Chest:      Chest wall: No tenderness.   Abdominal:      General: Bowel sounds are normal. There is no distension.      Palpations: Abdomen is soft.      Tenderness: There is no abdominal tenderness. There is no rebound.   Musculoskeletal:         General: No tenderness.      Cervical back: Neck supple.   Lymphadenopathy:      Cervical: No cervical adenopathy.   Skin:     General: Skin is warm and dry.      Findings: No erythema or rash.   Neurological:      Mental Status: She is alert and oriented to person, place, and time.   Psychiatric:         Behavior: Behavior normal.       Assessment and Plan:       1. Annual physical exam  Labs reviewed    2. Visit for screening mammogram    - Mammo Digital Screening Bilat w/ [facility]; Future    3. Postmenopausal    - DXA Bone Density Spine And Hip; Future          No follow-ups on file.

## 2023-01-26 ENCOUNTER — OFFICE VISIT (OUTPATIENT)
Dept: INTERNAL MEDICINE | Facility: CLINIC | Age: 78
End: 2023-01-26
Payer: MEDICARE

## 2023-01-26 VITALS
HEIGHT: 64 IN | HEART RATE: 65 BPM | BODY MASS INDEX: 20.32 KG/M2 | WEIGHT: 119.06 LBS | OXYGEN SATURATION: 96 % | DIASTOLIC BLOOD PRESSURE: 62 MMHG | SYSTOLIC BLOOD PRESSURE: 122 MMHG | RESPIRATION RATE: 20 BRPM | TEMPERATURE: 98 F

## 2023-01-26 DIAGNOSIS — Z12.31 VISIT FOR SCREENING MAMMOGRAM: ICD-10-CM

## 2023-01-26 DIAGNOSIS — Z78.0 POSTMENOPAUSAL: ICD-10-CM

## 2023-01-26 DIAGNOSIS — Z00.00 ANNUAL PHYSICAL EXAM: Primary | ICD-10-CM

## 2023-01-26 PROCEDURE — 99999 PR PBB SHADOW E&M-EST. PATIENT-LVL IV: CPT | Mod: PBBFAC,,, | Performed by: INTERNAL MEDICINE

## 2023-01-26 PROCEDURE — 99214 OFFICE O/P EST MOD 30 MIN: CPT | Mod: S$PBB,,, | Performed by: INTERNAL MEDICINE

## 2023-01-26 PROCEDURE — 99999 PR PBB SHADOW E&M-EST. PATIENT-LVL IV: ICD-10-PCS | Mod: PBBFAC,,, | Performed by: INTERNAL MEDICINE

## 2023-01-26 PROCEDURE — 99214 OFFICE O/P EST MOD 30 MIN: CPT | Mod: PBBFAC,PO | Performed by: INTERNAL MEDICINE

## 2023-01-26 PROCEDURE — 99214 PR OFFICE/OUTPT VISIT, EST, LEVL IV, 30-39 MIN: ICD-10-PCS | Mod: S$PBB,,, | Performed by: INTERNAL MEDICINE

## 2023-02-06 ENCOUNTER — TELEPHONE (OUTPATIENT)
Dept: INTERNAL MEDICINE | Facility: CLINIC | Age: 78
End: 2023-02-06
Payer: MEDICARE

## 2023-02-06 RX ORDER — NITROFURANTOIN 25; 75 MG/1; MG/1
100 CAPSULE ORAL 2 TIMES DAILY
Qty: 14 CAPSULE | Refills: 0 | Status: SHIPPED | OUTPATIENT
Start: 2023-02-06 | End: 2023-02-13

## 2023-02-06 NOTE — TELEPHONE ENCOUNTER
----- Message from Jazz Foy sent at 2/6/2023  9:32 AM CST -----  Contact: [phone]  Pt is calling for the nurse to call her back in regards to her starting with a UTI and she states the dr told her she saw something in her urine and she is asking if something can be called in for her instead of her having to come back and do another urine sample

## 2023-02-06 NOTE — TELEPHONE ENCOUNTER
Spoke to pt, pt states during her last office visit on 1/19/2023 you all discussed results concerning urine , pt states she was not worried about the results at the time but now she is , pt states she is now beginning to have UTI symptoms and would like something to be called in , pt states she do not want to come give another urine sample.

## 2023-03-21 ENCOUNTER — HOSPITAL ENCOUNTER (OUTPATIENT)
Dept: RADIOLOGY | Facility: HOSPITAL | Age: 78
Discharge: HOME | End: 2023-03-21
Attending: INTERNAL MEDICINE
Payer: MEDICARE

## 2023-03-21 DIAGNOSIS — Z12.31 VISIT FOR SCREENING MAMMOGRAM: ICD-10-CM

## 2023-03-21 DIAGNOSIS — Z78.0 POSTMENOPAUSAL: ICD-10-CM

## 2023-03-21 PROCEDURE — 77080 DEXA BONE DENSITY SPINE HIP: ICD-10-PCS | Mod: 26,,, | Performed by: INTERNAL MEDICINE

## 2023-03-21 PROCEDURE — 77080 DXA BONE DENSITY AXIAL: CPT | Mod: 26,,, | Performed by: INTERNAL MEDICINE

## 2023-03-21 PROCEDURE — 77063 MAMMO DIGITAL SCREENING BILAT WITH TOMO: ICD-10-PCS | Mod: 26,,, | Performed by: RADIOLOGY

## 2023-03-21 PROCEDURE — 77067 MAMMO DIGITAL SCREENING BILAT WITH TOMO: ICD-10-PCS | Mod: 26,,, | Performed by: RADIOLOGY

## 2023-03-21 PROCEDURE — 77067 SCR MAMMO BI INCL CAD: CPT | Mod: TC,PO

## 2023-03-21 PROCEDURE — 77080 DXA BONE DENSITY AXIAL: CPT | Mod: TC

## 2023-03-21 PROCEDURE — 77067 SCR MAMMO BI INCL CAD: CPT | Mod: 26,,, | Performed by: RADIOLOGY

## 2023-03-21 PROCEDURE — 77063 BREAST TOMOSYNTHESIS BI: CPT | Mod: 26,,, | Performed by: RADIOLOGY

## 2023-05-26 ENCOUNTER — PES CALL (OUTPATIENT)
Dept: ADMINISTRATIVE | Facility: CLINIC | Age: 78
End: 2023-05-26
Payer: MEDICARE

## 2023-06-22 ENCOUNTER — OFFICE VISIT (OUTPATIENT)
Dept: URGENT CARE | Facility: CLINIC | Age: 78
End: 2023-06-22
Payer: MEDICARE

## 2023-06-22 VITALS
TEMPERATURE: 98 F | HEART RATE: 85 BPM | BODY MASS INDEX: 20.32 KG/M2 | OXYGEN SATURATION: 97 % | SYSTOLIC BLOOD PRESSURE: 134 MMHG | DIASTOLIC BLOOD PRESSURE: 70 MMHG | WEIGHT: 119 LBS | RESPIRATION RATE: 18 BRPM | HEIGHT: 64 IN
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
## 2023-06-22 DIAGNOSIS — S61.259A CAT BITE OF FINGER, INITIAL ENCOUNTER: Primary | ICD-10-CM

## 2023-06-22 PROCEDURE — 90715 TDAP VACCINE GREATER THAN OR EQUAL TO 7YO IM: ICD-10-PCS | Mod: S$GLB,,,

## 2023-06-22 PROCEDURE — 99203 OFFICE O/P NEW LOW 30 MIN: CPT | Mod: 25,S$GLB,,

## 2023-06-22 PROCEDURE — 90471 TDAP VACCINE GREATER THAN OR EQUAL TO 7YO IM: ICD-10-PCS | Mod: S$GLB,,,

## 2023-06-22 PROCEDURE — 99203 PR OFFICE/OUTPT VISIT, NEW, LEVL III, 30-44 MIN: ICD-10-PCS | Mod: 25,S$GLB,,

## 2023-06-22 PROCEDURE — 90471 IMMUNIZATION ADMIN: CPT | Mod: S$GLB,,,

## 2023-06-22 PROCEDURE — 90715 TDAP VACCINE 7 YRS/> IM: CPT | Mod: S$GLB,,,

## 2023-06-22 RX ORDER — AMOXICILLIN AND CLAVULANATE POTASSIUM 875; 125 MG/1; MG/1
1 TABLET, FILM COATED ORAL 2 TIMES DAILY
Qty: 14 TABLET | Refills: 0 | Status: SHIPPED | OUTPATIENT
Start: 2023-06-22 | End: 2023-06-29

## 2023-06-22 NOTE — PATIENT INSTRUCTIONS
Animal Bites    If your condition worsens or fails to improve we recommend that you receive another evaluation at the ER immediately or contact your PCP to discuss your concerns or return here. You must understand that you've received an urgent care treatment only and that you may be released before all your medical problems are known or treated. You the patient will arrange for follouwp care as instructed.     Use cool compresses for 20 minutes 3-5 times a day. Avoid picking or manipulating the wound. Clean the wound twice a day with soap and water.  Apply antibiotic ointment on it.       - Pain recommendations:  Alternate Tylenol or Ibuprofen as directed for fever/pain. Avoid tylenol if you have a history of liver disease. Do not take ibuprofen if you have a history of GI bleeding, kidney disease, or if you take blood thinners.  Take ibuprofen 600-800 mg every 6-8 hours for pain and inflammation.  You can also take Tylenol/acetaminophen 650-1000 mg every 6-8 hours for added pain relief.     Please return here in 1-3 days for a recheck of your wound if not healing.    You should seek ER treatment if fever, increase pain, swelling or other signs of increasing infection.   If you were prescribed antibiotics, please take them to completion  If you are female and on BCP use additional methods to prevent pregnancy while on antibiotics and for one cycle after.

## 2023-06-22 NOTE — PROGRESS NOTES
"Subjective:      Patient ID: Trista Lozano is a 77 y.o. female.    Vitals:  height is 5' 4" (1.626 m) and weight is 54 kg (119 lb). Her temperature is 97.7 °F (36.5 °C). Her blood pressure is 134/70 and her pulse is 85. Her respiration is 18 and oxygen saturation is 97%.     Chief Complaint: Animal Bite (Left pinky finger)    77 yr female present with Cat bite to left pinky finger. Injury happened last night . Pt states that a outside cat got in her house and she went to get the cat out from under the bed and he bit her finger. Pain3/10    Provider note:    78 yo F c/o cat bite to left pinky finger that happened last night when a stray cat got into her house. Denies fever. Pain 3/10    Animal Bite   The incident occurred yesterday. The incident occurred at home. Head/neck injury location: left pinky finger. There is an injury to the Left little finger. The pain is mild. It is unlikely that a foreign body is present. There have been no prior injuries to these areas. She is Right-handed. Her tetanus status is unknown. There were no sick contacts. She has received no recent medical care.     Constitution: Negative for chills, sweating, fatigue and fever.   Skin:  Positive for color change, puncture wound and erythema. Negative for pale, rash, bruising and abscess.    Objective:     Physical Exam   HENT:   Head: Normocephalic and atraumatic.   Abdominal: Normal appearance.   Neurological: She is alert.   Skin: Skin is warm and dry. erythema       Assessment:     1. Cat bite of finger, initial encounter        Plan:   Pt would like to proceed with tdap update today. Signs of infection reviewed with pt in depth.     Cat bite of finger, initial encounter  -     amoxicillin-clavulanate 875-125mg (AUGMENTIN) 875-125 mg per tablet; Take 1 tablet by mouth 2 (two) times daily. for 7 days  Dispense: 14 tablet; Refill: 0  -     Tdap Vaccine                Patient Instructions   Animal Bites    If your condition worsens or fails to " improve we recommend that you receive another evaluation at the ER immediately or contact your PCP to discuss your concerns or return here. You must understand that you've received an urgent care treatment only and that you may be released before all your medical problems are known or treated. You the patient will arrange for follouwp care as instructed.     Use cool compresses for 20 minutes 3-5 times a day. Avoid picking or manipulating the wound. Clean the wound twice a day with soap and water.  Apply antibiotic ointment on it.       - Pain recommendations:  Alternate Tylenol or Ibuprofen as directed for fever/pain. Avoid tylenol if you have a history of liver disease. Do not take ibuprofen if you have a history of GI bleeding, kidney disease, or if you take blood thinners.  Take ibuprofen 600-800 mg every 6-8 hours for pain and inflammation.  You can also take Tylenol/acetaminophen 650-1000 mg every 6-8 hours for added pain relief.     Please return here in 1-3 days for a recheck of your wound if not healing.    You should seek ER treatment if fever, increase pain, swelling or other signs of increasing infection.   If you were prescribed antibiotics, please take them to completion  If you are female and on BCP use additional methods to prevent pregnancy while on antibiotics and for one cycle after.

## 2023-07-26 ENCOUNTER — PES CALL (OUTPATIENT)
Dept: ADMINISTRATIVE | Facility: CLINIC | Age: 78
End: 2023-07-26
Payer: MEDICARE

## 2023-11-21 ENCOUNTER — OFFICE VISIT (OUTPATIENT)
Dept: URGENT CARE | Facility: CLINIC | Age: 78
End: 2023-11-21
Payer: MEDICARE

## 2023-11-21 ENCOUNTER — TELEPHONE (OUTPATIENT)
Dept: INTERNAL MEDICINE | Facility: CLINIC | Age: 78
End: 2023-11-21
Payer: MEDICARE

## 2023-11-21 VITALS
SYSTOLIC BLOOD PRESSURE: 141 MMHG | TEMPERATURE: 98 F | OXYGEN SATURATION: 98 % | HEART RATE: 89 BPM | HEIGHT: 64 IN | DIASTOLIC BLOOD PRESSURE: 76 MMHG | BODY MASS INDEX: 20.32 KG/M2 | RESPIRATION RATE: 18 BRPM | WEIGHT: 119 LBS

## 2023-11-21 DIAGNOSIS — R30.0 DYSURIA: ICD-10-CM

## 2023-11-21 DIAGNOSIS — N30.01 ACUTE CYSTITIS WITH HEMATURIA: Primary | ICD-10-CM

## 2023-11-21 LAB
BILIRUB UR QL STRIP: POSITIVE
GLUCOSE UR QL STRIP: NEGATIVE
KETONES UR QL STRIP: POSITIVE
LEUKOCYTE ESTERASE UR QL STRIP: NEGATIVE
PH, POC UA: 6.5 (ref 5–8)
POC BLOOD, URINE: POSITIVE
POC NITRATES, URINE: POSITIVE
PROT UR QL STRIP: POSITIVE
SP GR UR STRIP: 1.02 (ref 1–1.03)
UROBILINOGEN UR STRIP-ACNC: 4 (ref 0.1–1.1)

## 2023-11-21 PROCEDURE — 99213 OFFICE O/P EST LOW 20 MIN: CPT | Mod: S$GLB,,, | Performed by: PHYSICIAN ASSISTANT

## 2023-11-21 PROCEDURE — 99213 PR OFFICE/OUTPT VISIT, EST, LEVL III, 20-29 MIN: ICD-10-PCS | Mod: S$GLB,,, | Performed by: PHYSICIAN ASSISTANT

## 2023-11-21 PROCEDURE — 81003 POCT URINALYSIS, DIPSTICK, AUTOMATED, W/O SCOPE: ICD-10-PCS | Mod: QW,S$GLB,, | Performed by: PHYSICIAN ASSISTANT

## 2023-11-21 PROCEDURE — 81003 URINALYSIS AUTO W/O SCOPE: CPT | Mod: QW,S$GLB,, | Performed by: PHYSICIAN ASSISTANT

## 2023-11-21 RX ORDER — NITROFURANTOIN 25; 75 MG/1; MG/1
100 CAPSULE ORAL 2 TIMES DAILY WITH MEALS
Qty: 14 CAPSULE | Refills: 0 | Status: SHIPPED | OUTPATIENT
Start: 2023-11-21 | End: 2023-11-28

## 2023-11-21 RX ORDER — PHENAZOPYRIDINE HYDROCHLORIDE 100 MG/1
100 TABLET, FILM COATED ORAL 3 TIMES DAILY PRN
Qty: 9 TABLET | Refills: 0 | Status: SHIPPED | OUTPATIENT
Start: 2023-11-21 | End: 2023-11-24

## 2023-11-21 NOTE — PROGRESS NOTES
"Subjective:      Patient ID: Trista Lozano is a 78 y.o. female.    Vitals:  height is 5' 4" (1.626 m) and weight is 54 kg (119 lb). Her oral temperature is 97.8 °F (36.6 °C). Her blood pressure is 141/76 (abnormal) and her pulse is 89. Her respiration is 18 and oxygen saturation is 98%.     Chief Complaint: Dysuria    78 yr old female came in with complaints of dysuria. Her symptoms include withholding, frequency, and burning. Her symptoms started Sunday.  Feels like previous UTI.  No fever, chills, flank pain, or hematuria.  Previously tolerated Macrobid with no issues by PCP for UTI.    Dysuria   This is a new problem. The current episode started in the past 7 days. The problem occurs every urination. The problem has been gradually worsening. The quality of the pain is described as aching. The pain is at a severity of 4/10. The pain is moderate. Associated symptoms include frequency, urgency and withholding. Pertinent negatives include no chills, discharge, flank pain, hematuria, nausea, vomiting, constipation or rash. She has tried nothing for the symptoms.     Constitution: Negative for activity change, appetite change, chills, sweating, fatigue, fever and generalized weakness.   HENT:  Negative for ear pain, hearing loss, facial swelling, congestion, postnasal drip, sinus pain, sinus pressure, sore throat, trouble swallowing and voice change.    Neck: Negative for neck pain, neck stiffness and painful lymph nodes.   Cardiovascular:  Negative for chest pain, leg swelling, palpitations, sob on exertion and passing out.   Eyes:  Negative for eye discharge, eye pain, photophobia, vision loss, double vision and blurred vision.   Respiratory:  Negative for chest tightness, cough, sputum production, bloody sputum, COPD, shortness of breath, stridor, wheezing and asthma.    Gastrointestinal:  Negative for abdominal pain, nausea, vomiting, constipation, diarrhea, bright red blood in stool, rectal bleeding, heartburn and " bowel incontinence.   Genitourinary:  Positive for dysuria, frequency and urgency. Negative for urine decreased, flank pain, bladder incontinence, hematuria, vaginal pain, vaginal discharge, vaginal bleeding, vaginal odor, painful intercourse, genital sore and pelvic pain.   Musculoskeletal:  Negative for trauma, joint pain, joint swelling, abnormal ROM of joint, muscle cramps and muscle ache.   Skin:  Negative for color change, pale, rash and wound.   Allergic/Immunologic: Negative for seasonal allergies, asthma and immunocompromised state.   Neurological:  Negative for dizziness, history of vertigo, light-headedness, passing out, facial drooping, speech difficulty, coordination disturbances, loss of balance, headaches, disorientation, altered mental status, loss of consciousness, numbness, tingling and seizures.   Hematologic/Lymphatic: Negative for swollen lymph nodes, easy bruising/bleeding and trouble clotting. Does not bruise/bleed easily.   Psychiatric/Behavioral:  Negative for altered mental status and disorientation.       Objective:     Physical Exam   Constitutional: She appears well-developed. She is cooperative.  Non-toxic appearance. She does not appear ill. No distress.   HENT:   Head: Normocephalic and atraumatic.   Ears:   Right Ear: Hearing, external ear and ear canal normal.   Left Ear: Hearing, external ear and ear canal normal.   Nose: Nose normal.   Mouth/Throat: Oropharynx is clear and moist. Mucous membranes are moist. Oropharynx is clear.   Eyes: Conjunctivae, EOM and lids are normal. Right eye exhibits no discharge. Left eye exhibits no discharge. vision grossly intact gaze aligned appropriately   Neck: Neck supple. No neck rigidity present.   Cardiovascular: Normal rate, regular rhythm and normal pulses.   Pulmonary/Chest: Effort normal. No accessory muscle usage. No respiratory distress. She has no wheezes. She exhibits no tenderness.   Abdominal: Normal appearance. She exhibits no  distension. There is no abdominal tenderness. There is no rebound, no guarding, no left CVA tenderness and no right CVA tenderness.   Musculoskeletal: Normal range of motion.         General: Normal range of motion.      Right lower leg: No edema.      Left lower leg: No edema.      Comments: Moves all extremities with normal tone, strength, and ROM.   Neurological: no focal deficit. She is alert and at baseline. She has normal motor skills and normal sensation. She displays no weakness, facial symmetry and normal reflexes. No cranial nerve deficit or sensory deficit. She exhibits normal muscle tone. Gait and coordination normal. Coordination normal.   Skin: Skin is warm, dry, not diaphoretic and no rash. Capillary refill takes less than 2 seconds.   Psychiatric: Her behavior is normal. Mood and thought content normal.   Nursing note and vitals reviewed.      Results for orders placed or performed in visit on 11/21/23   POCT Urinalysis, Dipstick, Automated, W/O Scope   Result Value Ref Range    POC Blood, Urine Positive (A) Negative    POC Bilirubin, Urine Positive (A) Negative    POC Urobilinogen, Urine 4.0 (A) 0.1 - 1.1    POC Ketones, Urine Positive (A) Negative    POC Protein, Urine Positive (A) Negative    POC Nitrates, Urine Positive (A) Negative    POC Glucose, Urine Negative Negative    pH, UA 6.5 5 - 8    POC Specific Gravity, Urine 1.020 1.003 - 1.029    POC Leukocytes, Urine Negative Negative       Assessment:     1. Acute cystitis with hematuria    2. Dysuria      Note dictated with voice recognition software, please excuse any grammatical errors.    Patient presents with clinical exam findings and history consistent with above.  We discussed the differential diagnosis.    On exam, patient is nontoxic appearing and vitals are stable.  Patient is essentially neurovascularly intact on exam.      Diagnostic testing results were independently reviewed and interpreted, which were discussed in depth with  patient.   Test ordered in clinic:  Urinalysis with nitrates.  Additionally, previous progress notes/admissions/lab were reviewed and interpreted.  CMP 01/19/2023 and 01/21/2022 with Good kidney function and EGFR.  Urine culture 09/14/2022 and 11/19/2022 reviewed; pansensitive      Plan:     If no improvements with conservative treatment, return for re-evaluation and urine culture.    Acute cystitis with hematuria  -     nitrofurantoin, macrocrystal-monohydrate, (MACROBID) 100 MG capsule; Take 1 capsule (100 mg total) by mouth 2 (two) times daily with meals. Take with food and increase oral hydration while on this medication. for 7 days  Dispense: 14 capsule; Refill: 0  -     phenazopyridine (PYRIDIUM) 100 MG tablet; Take 1 tablet (100 mg total) by mouth 3 (three) times daily as needed for Pain (Take with food for urinary pain).  Dispense: 9 tablet; Refill: 0    Dysuria  -     POCT Urinalysis, Dipstick, Automated, W/O Scope  -     nitrofurantoin, macrocrystal-monohydrate, (MACROBID) 100 MG capsule; Take 1 capsule (100 mg total) by mouth 2 (two) times daily with meals. Take with food and increase oral hydration while on this medication. for 7 days  Dispense: 14 capsule; Refill: 0  -     phenazopyridine (PYRIDIUM) 100 MG tablet; Take 1 tablet (100 mg total) by mouth 3 (three) times daily as needed for Pain (Take with food for urinary pain).  Dispense: 9 tablet; Refill: 0      Note dictated with voice recognition software, please excuse any grammatical errors.    We had shared decision making for patient's treatment. We discussed side effects/alternatives/benefits/risk and patient would like to proceed with treatment plan. We also discussed other OTC treatment recommendations.    Patient was counseled, explained with the test results meaning, expected course, and answered all of questions. They can also receive results via my chart.        Patient was instructed to return for re-evaluation with urgent care or PCP for  continued outpatient workup and management if symptoms do not improve/worsening symptoms. Strict ED versus clinic precautions given in depth.   Guideline, discharge and follow-up instructions given verbally/printed; patient will also receive via [employer]. Patient verbalized understanding and agreed with the entirety of plan of care.         Additional MDM:     Heart Failure Score:   COPD = No        Patient Instructions   PLEASE READ YOUR DISCHARGE INSTRUCTIONS ENTIRELY AS IT CONTAINS IMPORTANT INFORMATION.  No improvements in symptoms after completing antibiotics, return re-evaluation and to obtain urine culture.  - Take the pyridium three times a day with meals for 3 days. It will turn your urine orange. You do not need to take the whole prescription you can stop this once the pain is better and finish out the antibiotics  - Drink plenty of fluids, wipe front to back, take showers not baths, no scented soaps, wear breathable cotton underwear, urinate after sexual intercourse.   - Tylenol or Ibuprofen as directed as needed for pain.    - If you were prescribed antibiotics, please take them to completion. Please supplement with OTC probiotics and yogurt.     -You must understand that you've received an Urgent Care treatment only and that you may be released before all your medical problems are known or treated. You, the patient, will arrange for follow up care as instructed. Please arrange follow up with your primary medical clinic within 2-5 days if your signs and symptoms have not resolved or worsen. Please go to the ER for worsening symptoms including worsening fever, flank pain, vomiting, unable to tolerate fluids, fatigue, etc.   - Follow up with your PCP or specialty clinic as directed in next 2-5 days for re-evaluation.  You can call [phone] to schedule an appointment with the appropriate provider.    - If your condition worsens or fails to improve we recommend that you receive another evaluation at the  "emergency room immediately or contact your primary medical clinic to discuss your concerns.     RED FLAGS/WARNING SYMPTOMS DISCUSSED WITH PATIENT THAT WOULD WARRANT EMERGENT MEDICAL ATTENTION. Patient aware and verbalized understanding.        Bladder Infection, Female (Adult)    Urine is normally doesn't have any bacteria in it. But bacteria can get into the urinary tract from the skin around the rectum. Or they can travel in the blood from elsewhere in the body. Once they are in your urinary tract, they can cause infection in the urethra (urethritis), the bladder (cystitis), or the kidneys (pyelonephritis).  The most common place for an infection is in the bladder. This is called a bladder infection. This is one of the most common infections in women. Most bladder infections are easily treated. They are not serious unless the infection spreads to the kidney.  The phrases "bladder infection," "UTI," and "cystitis" are often used to describe the same thing. But they are not always the same. Cystitis is an inflammation of the bladder. The most common cause of cystitis is an infection.  Symptoms  The infection causes inflammation in the urethra and bladder. This causes many of the symptoms. The most common symptoms of a bladder infection are:  Pain or burning when urinating  Having to urinate more often than usual  Urgent need to urinate  Only a small amount of urine comes out  Blood in urine  Abdominal discomfort. This is usually in the lower abdomen above the pubic bone.  Cloudy urine  Strong- or bad-smelling urine  Unable to urinate (urinary retention)  Unable to hold urine in (urinary incontinence)  Fever  Loss of appetite  Confusion (in older adults)  Causes  Bladder infections are not contagious. You can't get one from someone else, from a toilet seat, or from sharing a bath.  The most common cause of bladder infections is bacteria from the bowels. The bacteria get onto the skin around the opening of the " urethra. From there, they can get into the urine and travel up to the bladder, causing inflammation and infection. This usually happens because of:  Wiping improperly after urinating. Always wipe from front to back.  Bowel incontinence  Pregnancy  Procedures such as having a catheter inserted  Older age  Not emptying your bladder. This can allow bacteria a chance to grow in your urine.  Dehydration  Constipation  Sex  Use of a diaphragm for birth control   Treatment  Bladder infections are diagnosed by a urine test. They are treated with antibiotics and usually clear up quickly without complications. Treatment helps prevent a more serious kidney infection.  Medicines  Medicines can help in the treatment of a bladder infection:  Take antibiotics until they are used up, even if you feel better. It is important to finish them to make sure the infection has cleared.  You can use acetaminophen or ibuprofen for pain, fever, or discomfort, unless another medicine was prescribed. If you have chronic liver or kidney disease, talk with your healthcare provider before using these medicines. Also talk with your provider if you've ever had a stomach ulcer or gastrointestinal bleeding, or are taking blood-thinner medicines.  If you are given phenazopydridine to reduce burning with urination, it will cause your urine to become a bright orange color. This can stain clothing.  Care and prevention  These self-care steps can help prevent future infections:  Drink plenty of fluids to prevent dehydration and flush out your bladder. Do this unless you must restrict fluids for other health reasons, or your doctor told you not to.  Proper cleaning after going to the bathroom is important. Wipe from front to back after using the toilet to prevent the spread of bacteria.  Urinate more often. Don't try to hold urine in for a long time.  Wear loose-fitting clothes and cotton underwear. Avoid tight-fitting pants.  Improve your diet and prevent  constipation. Eat more fresh fruit and vegetables, and fiber, and less junk and fatty foods.  Avoid sex until your symptoms are gone.  Avoid caffeine, alcohol, and spicy foods. These can irritate your bladder.  Urinate right after intercourse to flush out your bladder.  If you use birth control pills and have frequent bladder infections, discuss it with your doctor.  Follow-up care  Call your healthcare provider if all symptoms are not gone after 3 days of treatment. This is especially important if you have repeat infections.  If a culture was done, you will be told if your treatment needs to be changed. If directed, you can call to find out the results.  If X-rays were done, you will be told if the results will affect your treatment.  Call 911  Call 911 if any of the following occur:  Trouble breathing  Hard to wake up or confusion  Fainting or loss of consciousness  Rapid heart rate  When to seek medical advice  Call your healthcare provider right away if any of these occur:  Fever of 100.4ºF (38.0ºC) or higher, or as directed by your healthcare provider  Symptoms are not better by the third day of treatment  Back or belly (abdominal) pain that gets worse  Repeated vomiting, or unable to keep medicine down  Weakness or dizziness  Vaginal discharge  Pain, redness, or swelling in the outer vaginal area (labia)  Date Last Reviewed: 10/1/2016  © 6374-1613 [employer]. [address] [place] [address], [place] [address]. All rights reserved. This information is not intended as a substitute for professional medical care. Always follow your healthcare professional's instructions.

## 2023-11-21 NOTE — TELEPHONE ENCOUNTER
----- Message from Rosemary Villeda sent at 11/21/2023 11:39 AM CST -----  Contact: [phone]  [email or web address]     Patient is calling for Medical Advice regarding:pt use to be a pt of Gabriela Rowland and  has  appt with you all on 1/30/24 but pt think she has a uti    How long has patient had these symptoms:2 days      Pharmacy name and phone#:   [id number] - [place] - [address]  [address] [address]  Phone: [phone] Fax: [phone]       Would like response via [employer]:  call back     Comments:

## 2023-11-21 NOTE — PATIENT INSTRUCTIONS
PLEASE READ YOUR DISCHARGE INSTRUCTIONS ENTIRELY AS IT CONTAINS IMPORTANT INFORMATION.  No improvements in symptoms after completing antibiotics, return re-evaluation and to obtain urine culture.  - Take the pyridium three times a day with meals for 3 days. It will turn your urine orange. You do not need to take the whole prescription you can stop this once the pain is better and finish out the antibiotics  - Drink plenty of fluids, wipe front to back, take showers not baths, no scented soaps, wear breathable cotton underwear, urinate after sexual intercourse.   - Tylenol or Ibuprofen as directed as needed for pain.    - If you were prescribed antibiotics, please take them to completion. Please supplement with OTC probiotics and yogurt.     -You must understand that you've received an Urgent Care treatment only and that you may be released before all your medical problems are known or treated. You, the patient, will arrange for follow up care as instructed. Please arrange follow up with your primary medical clinic within 2-5 days if your signs and symptoms have not resolved or worsen. Please go to the ER for worsening symptoms including worsening fever, flank pain, vomiting, unable to tolerate fluids, fatigue, etc.   - Follow up with your PCP or specialty clinic as directed in next 2-5 days for re-evaluation.  You can call [phone] to schedule an appointment with the appropriate provider.    - If your condition worsens or fails to improve we recommend that you receive another evaluation at the emergency room immediately or contact your primary medical clinic to discuss your concerns.     RED FLAGS/WARNING SYMPTOMS DISCUSSED WITH PATIENT THAT WOULD WARRANT EMERGENT MEDICAL ATTENTION. Patient aware and verbalized understanding.        Bladder Infection, Female (Adult)    Urine is normally doesn't have any bacteria in it. But bacteria can get into the urinary tract from the skin around the rectum. Or they can  "travel in the blood from elsewhere in the body. Once they are in your urinary tract, they can cause infection in the urethra (urethritis), the bladder (cystitis), or the kidneys (pyelonephritis).  The most common place for an infection is in the bladder. This is called a bladder infection. This is one of the most common infections in women. Most bladder infections are easily treated. They are not serious unless the infection spreads to the kidney.  The phrases "bladder infection," "UTI," and "cystitis" are often used to describe the same thing. But they are not always the same. Cystitis is an inflammation of the bladder. The most common cause of cystitis is an infection.  Symptoms  The infection causes inflammation in the urethra and bladder. This causes many of the symptoms. The most common symptoms of a bladder infection are:  Pain or burning when urinating  Having to urinate more often than usual  Urgent need to urinate  Only a small amount of urine comes out  Blood in urine  Abdominal discomfort. This is usually in the lower abdomen above the pubic bone.  Cloudy urine  Strong- or bad-smelling urine  Unable to urinate (urinary retention)  Unable to hold urine in (urinary incontinence)  Fever  Loss of appetite  Confusion (in older adults)  Causes  Bladder infections are not contagious. You can't get one from someone else, from a toilet seat, or from sharing a bath.  The most common cause of bladder infections is bacteria from the bowels. The bacteria get onto the skin around the opening of the urethra. From there, they can get into the urine and travel up to the bladder, causing inflammation and infection. This usually happens because of:  Wiping improperly after urinating. Always wipe from front to back.  Bowel incontinence  Pregnancy  Procedures such as having a catheter inserted  Older age  Not emptying your bladder. This can allow bacteria a chance to grow in your urine.  Dehydration  Constipation  Sex  Use of " a diaphragm for birth control   Treatment  Bladder infections are diagnosed by a urine test. They are treated with antibiotics and usually clear up quickly without complications. Treatment helps prevent a more serious kidney infection.  Medicines  Medicines can help in the treatment of a bladder infection:  Take antibiotics until they are used up, even if you feel better. It is important to finish them to make sure the infection has cleared.  You can use acetaminophen or ibuprofen for pain, fever, or discomfort, unless another medicine was prescribed. If you have chronic liver or kidney disease, talk with your healthcare provider before using these medicines. Also talk with your provider if you've ever had a stomach ulcer or gastrointestinal bleeding, or are taking blood-thinner medicines.  If you are given phenazopydridine to reduce burning with urination, it will cause your urine to become a bright orange color. This can stain clothing.  Care and prevention  These self-care steps can help prevent future infections:  Drink plenty of fluids to prevent dehydration and flush out your bladder. Do this unless you must restrict fluids for other health reasons, or your doctor told you not to.  Proper cleaning after going to the bathroom is important. Wipe from front to back after using the toilet to prevent the spread of bacteria.  Urinate more often. Don't try to hold urine in for a long time.  Wear loose-fitting clothes and cotton underwear. Avoid tight-fitting pants.  Improve your diet and prevent constipation. Eat more fresh fruit and vegetables, and fiber, and less junk and fatty foods.  Avoid sex until your symptoms are gone.  Avoid caffeine, alcohol, and spicy foods. These can irritate your bladder.  Urinate right after intercourse to flush out your bladder.  If you use birth control pills and have frequent bladder infections, discuss it with your doctor.  Follow-up care  Call your healthcare provider if all  symptoms are not gone after 3 days of treatment. This is especially important if you have repeat infections.  If a culture was done, you will be told if your treatment needs to be changed. If directed, you can call to find out the results.  If X-rays were done, you will be told if the results will affect your treatment.  Call 911  Call 911 if any of the following occur:  Trouble breathing  Hard to wake up or confusion  Fainting or loss of consciousness  Rapid heart rate  When to seek medical advice  Call your healthcare provider right away if any of these occur:  Fever of 100.4ºF (38.0ºC) or higher, or as directed by your healthcare provider  Symptoms are not better by the third day of treatment  Back or belly (abdominal) pain that gets worse  Repeated vomiting, or unable to keep medicine down  Weakness or dizziness  Vaginal discharge  Pain, redness, or swelling in the outer vaginal area (labia)  Date Last Reviewed: 10/1/2016  © 9464-0338 [employer]. [address] [place] [address], [place] [address]. All rights reserved. This information is not intended as a substitute for professional medical care. Always follow your healthcare professional's instructions.

## 2024-01-15 ENCOUNTER — OFFICE VISIT (OUTPATIENT)
Dept: URGENT CARE | Facility: CLINIC | Age: 79
End: 2024-01-15
Payer: MEDICARE

## 2024-01-15 VITALS
DIASTOLIC BLOOD PRESSURE: 71 MMHG | WEIGHT: 119.06 LBS | HEART RATE: 70 BPM | RESPIRATION RATE: 18 BRPM | SYSTOLIC BLOOD PRESSURE: 150 MMHG | HEIGHT: 64 IN | TEMPERATURE: 98 F | OXYGEN SATURATION: 98 % | BODY MASS INDEX: 20.32 KG/M2

## 2024-01-15 DIAGNOSIS — N39.0 URINARY TRACT INFECTION WITHOUT HEMATURIA, SITE UNSPECIFIED: Primary | ICD-10-CM

## 2024-01-15 LAB
BILIRUB UR QL STRIP: NEGATIVE
GLUCOSE UR QL STRIP: NEGATIVE
KETONES UR QL STRIP: NEGATIVE
LEUKOCYTE ESTERASE UR QL STRIP: POSITIVE
PH, POC UA: 7 (ref 5–8)
POC BLOOD, URINE: POSITIVE
POC NITRATES, URINE: NEGATIVE
PROT UR QL STRIP: NEGATIVE
SP GR UR STRIP: 1.01 (ref 1–1.03)
UROBILINOGEN UR STRIP-ACNC: NORMAL (ref 0.1–1.1)

## 2024-01-15 PROCEDURE — 99213 OFFICE O/P EST LOW 20 MIN: CPT | Mod: S$GLB,,, | Performed by: FAMILY MEDICINE

## 2024-01-15 PROCEDURE — 81003 URINALYSIS AUTO W/O SCOPE: CPT | Mod: QW,S$GLB,, | Performed by: FAMILY MEDICINE

## 2024-01-15 RX ORDER — CIPROFLOXACIN 500 MG/1
500 TABLET ORAL 2 TIMES DAILY
Qty: 14 TABLET | Refills: 0 | Status: SHIPPED | OUTPATIENT
Start: 2024-01-15 | End: 2024-01-22

## 2024-01-15 NOTE — PROGRESS NOTES
"Subjective:      Patient ID: Trista Lozano is a 78 y.o. female.    Vitals:  height is 5' 4" (1.626 m) and weight is 54 kg (119 lb 0.8 oz). Her oral temperature is 98.1 °F (36.7 °C). Her blood pressure is 150/71 (abnormal) and her pulse is 70. Her respiration is 18 and oxygen saturation is 98%.     Chief Complaint: Urinary Frequency    Pt present with frequent urination. Sx started yesterday.     Urinary Frequency   This is a new problem. The current episode started yesterday. The problem occurs every urination. The problem has been gradually worsening. The pain is at a severity of 0/10. The patient is experiencing no pain. There has been no fever. She is Not sexually active. There is No history of pyelonephritis. Associated symptoms include frequency. She has tried nothing for the symptoms. The treatment provided no relief.       Genitourinary:  Positive for frequency.      Objective:     Physical Exam   HENT:   Head: Normocephalic and atraumatic.   Neck: Neck supple.   Cardiovascular: Normal rate, regular rhythm, normal heart sounds and normal pulses.   Pulmonary/Chest: Effort normal and breath sounds normal.   Abdominal: Normal appearance.   Neurological: She is alert.   Nursing note and vitals reviewed.    Results for orders placed or performed in visit on 01/15/24   POCT Urinalysis, Dipstick, Automated, W/O Scope   Result Value Ref Range    POC Blood, Urine Positive (A) Negative, Positive Slide, Positive Tube    POC Bilirubin, Urine Negative Negative, Positive Slide, Positive Tube    POC Urobilinogen, Urine Normal 0.1 - 1.1    POC Ketones, Urine Negative Negative, Positive Slide, Positive Tube    POC Protein, Urine Negative Negative, Positive Slide, Positive Tube    POC Nitrates, Urine Negative Negative, Positive Slide, Positive Tube    POC Glucose, Urine Negative Negative, Positive Slide, Positive Tube    pH, UA 7.0 5 - 8    POC Specific Gravity, Urine 1.015 1.003 - 1.029    POC Leukocytes, Urine Positive (A) " Negative, Positive Slide, Positive Tube        Assessment:     1. Urinary tract infection without hematuria, site unspecified        Plan:       Urinary tract infection without hematuria, site unspecified  -     POCT Urinalysis, Dipstick, Automated, W/O Scope  -     ciprofloxacin HCl (CIPRO) 500 MG tablet; Take 1 tablet (500 mg total) by mouth 2 (two) times daily. for 7 days  Dispense: 14 tablet; Refill: 0    Increased fluids recommended.

## 2024-01-30 ENCOUNTER — OFFICE VISIT (OUTPATIENT)
Dept: INTERNAL MEDICINE | Facility: CLINIC | Age: 79
End: 2024-01-30
Payer: MEDICARE

## 2024-01-30 VITALS
TEMPERATURE: 97 F | HEART RATE: 88 BPM | BODY MASS INDEX: 20.67 KG/M2 | SYSTOLIC BLOOD PRESSURE: 128 MMHG | HEIGHT: 64 IN | WEIGHT: 121.06 LBS | DIASTOLIC BLOOD PRESSURE: 80 MMHG | OXYGEN SATURATION: 97 %

## 2024-01-30 DIAGNOSIS — Z00.00 ANNUAL PHYSICAL EXAM: Primary | ICD-10-CM

## 2024-01-30 DIAGNOSIS — E78.5 HYPERLIPIDEMIA, UNSPECIFIED HYPERLIPIDEMIA TYPE: ICD-10-CM

## 2024-01-30 DIAGNOSIS — M85.89 OSTEOPENIA OF MULTIPLE SITES: ICD-10-CM

## 2024-01-30 DIAGNOSIS — Z23 NEED FOR VACCINATION: ICD-10-CM

## 2024-01-30 DIAGNOSIS — Z12.31 ENCOUNTER FOR SCREENING MAMMOGRAM FOR BREAST CANCER: ICD-10-CM

## 2024-01-30 PROCEDURE — 99999 PR PBB SHADOW E&M-EST. PATIENT-LVL III: CPT | Mod: PBBFAC,,, | Performed by: INTERNAL MEDICINE

## 2024-01-30 PROCEDURE — G0008 ADMIN INFLUENZA VIRUS VAC: HCPCS | Mod: PBBFAC,PO

## 2024-01-30 PROCEDURE — 90694 VACC AIIV4 NO PRSRV 0.5ML IM: CPT | Mod: PBBFAC,PO

## 2024-01-30 PROCEDURE — 99215 OFFICE O/P EST HI 40 MIN: CPT | Mod: S$PBB,,, | Performed by: INTERNAL MEDICINE

## 2024-01-30 PROCEDURE — 99999PBSHW FLU VACCINE - QUADRIVALENT - ADJUVANTED: Mod: PBBFAC,,,

## 2024-01-30 PROCEDURE — 99213 OFFICE O/P EST LOW 20 MIN: CPT | Mod: PBBFAC,PO,25 | Performed by: INTERNAL MEDICINE

## 2024-01-30 PROCEDURE — 99999PBSHW PNEUMOCOCCAL CONJUGATE VACCINE 20-VALENT: Mod: PBBFAC,,,

## 2024-01-30 PROCEDURE — 90677 PCV20 VACCINE IM: CPT | Mod: PBBFAC,PO

## 2024-01-30 NOTE — PROGRESS NOTES
Subjective     Patient ID: Trista Lozano is a 78 y.o. female.    Chief Complaint: Annual Exam    HPI  78 y.o. Female here for annual exam.     Vaccines: Influenza (2024); Tetanus (2023); PNA (current); Shingrix (done)  Eye exam: current  Mammogram: 3/23  Gyn exam: declined  Colonoscopy: 4/16- pt declining repeat  DEXA: 3/23    Exercise: no  Diet: regular     Past Medical History:  No date: Hyperlipidemia  No date: Menarche      Comment:  Age of onset 12  No date: Osteopenia  Past Surgical History:  4/20/2016: COLONOSCOPY; N/A      Comment:  Procedure: COLONOSCOPY;  Surgeon: Ellis Fry MD;  Location: [facility] ([address] [place] [address]);  Service: Endoscopy;                Laterality: N/A;  Pt request Prep[employer]/covered by                insurance/[employer] is not  No date: NO PAST SURGERIES  Social History    Socioeconomic History      Marital status:     Tobacco Use      Smoking status: Never      Smokeless tobacco: Never    Substance and Sexual Activity      Alcohol use: Yes        Comment: Socially/ few drinks (2-3) every week      Drug use: No      Sexual activity: Yes        Birth control/protection: Post-menopausal    Social Determinants of Health  Financial Resource Strain: Low Risk  (4/7/2022)      Overall Financial Resource Strain (CARDIA)          Difficulty of Paying Living Expenses: Not hard at all  Food Insecurity: No Food Insecurity (4/7/2022)      Hunger Vital Sign          Worried About Running Out of Food in the Last Year: Never true          Ran Out of Food in the Last Year: Never true  Transportation Needs: No Transportation Needs (4/7/2022)      PRAPARE - Transportation          Lack of Transportation (Medical): No          Lack of Transportation (Non-Medical): No  Stress: No Stress Concern Present (4/7/2022)      [employer] - Occupational Stress Questionnaire          Feeling of Stress : Only a little  Housing Stability: Unknown (4/7/2022)      Housing  Stability Vital Sign          Unable to Pay for Housing in the Last Year: No          Unstable Housing in the Last Year: No  Review of patient's allergies indicates:  No Known Allergies  Trista Lozano had no medications administered during this visit.  Review of Systems   Constitutional:  Negative for activity change, appetite change, chills, diaphoresis, fatigue, fever and unexpected weight change.   HENT:  Negative for nasal congestion, mouth sores, postnasal drip, rhinorrhea, sinus pressure/congestion, sneezing, sore throat, trouble swallowing and voice change.    Eyes:  Negative for pain, discharge and visual disturbance.   Respiratory:  Negative for cough, shortness of breath and wheezing.    Cardiovascular:  Negative for chest pain, palpitations and leg swelling.   Gastrointestinal:  Negative for abdominal pain, blood in stool, constipation, diarrhea, nausea and vomiting.   Endocrine: Negative for cold intolerance and heat intolerance.   Genitourinary:  Negative for difficulty urinating, dysuria, frequency, hematuria and urgency.   Musculoskeletal:  Negative for arthralgias and myalgias.   Integumentary:  Negative for rash and wound.   Allergic/Immunologic: Negative for environmental allergies and food allergies.   Neurological:  Negative for dizziness, tremors, seizures, syncope, weakness, light-headedness and headaches.   Hematological:  Negative for adenopathy. Does not bruise/bleed easily.   Psychiatric/Behavioral:  Negative for confusion and sleep disturbance. The patient is not nervous/anxious.           Objective     Physical Exam  Vitals and nursing note reviewed.   Constitutional:       General: She is not in acute distress.     Appearance: Normal appearance. She is well-developed. She is not diaphoretic.   HENT:      Head: Normocephalic and atraumatic.      Right Ear: External ear normal.      Left Ear: External ear normal.      Nose: Nose normal.      Mouth/Throat:      Pharynx: No oropharyngeal  exudate.   Eyes:      General: No scleral icterus.        Right eye: No discharge.         Left eye: No discharge.      Conjunctiva/sclera: Conjunctivae normal.      Pupils: Pupils are equal, round, and reactive to light.   Neck:      Thyroid: No thyromegaly.      Vascular: No JVD.   Cardiovascular:      Rate and Rhythm: Normal rate and regular rhythm.      Pulses: Normal pulses.      Heart sounds: Normal heart sounds. No murmur heard.  Pulmonary:      Effort: Pulmonary effort is normal. No respiratory distress.      Breath sounds: Normal breath sounds. No wheezing, rhonchi or rales.   Chest:      Chest wall: No tenderness.   Abdominal:      General: Bowel sounds are normal. There is no distension.      Palpations: Abdomen is soft.      Tenderness: There is no abdominal tenderness. There is no guarding or rebound.   Musculoskeletal:      Cervical back: Neck supple.      Right lower leg: No edema.      Left lower leg: No edema.   Lymphadenopathy:      Cervical: No cervical adenopathy.   Skin:     General: Skin is warm and dry.      Coloration: Skin is not pale.      Findings: No rash.   Neurological:      General: No focal deficit present.      Mental Status: She is alert and oriented to person, place, and time.      Gait: Gait normal.   Psychiatric:         Behavior: Behavior normal.         Thought Content: Thought content normal.         Judgment: Judgment normal.            Assessment and Plan     1. Annual physical exam  -     CBC Auto Differential; Future; Expected date: 01/30/2024  -     Comprehensive Metabolic Panel; Future; Expected date: 01/30/2024  -     TSH; Future; Expected date: 01/30/2024  -     Lipid Panel; Future; Expected date: 01/30/2024  -     Urinalysis; Future  -     (In Office Administered) Pneumococcal Conjugate Vaccine (20 Valent) (IM) (Preferred)    2. Osteopenia of multiple sites    3. Hyperlipidemia, unspecified hyperlipidemia type  -     CBC Auto Differential; Future; Expected date:  01/30/2024  -     Comprehensive Metabolic Panel; Future; Expected date: 01/30/2024  -     TSH; Future; Expected date: 01/30/2024  -     Lipid Panel; Future; Expected date: 01/30/2024  -     Urinalysis; Future    4. Encounter for screening mammogram for breast cancer  -     Mammo Digital Screening Bilat w/ Wagner; Future; Expected date: 03/22/2024        Osteopenia- continue Fosamax    Dyslipidemia- diet controlled    F/u in 1 yr      Over 1/2 of 40 minute visit spent reviewing pt's medical records, education/discussion of pt's medical conditions and medical management

## 2024-02-05 ENCOUNTER — LAB VISIT (OUTPATIENT)
Dept: LAB | Facility: HOSPITAL | Age: 79
End: 2024-02-05
Attending: INTERNAL MEDICINE
Payer: MEDICARE

## 2024-02-05 DIAGNOSIS — E78.5 HYPERLIPIDEMIA, UNSPECIFIED HYPERLIPIDEMIA TYPE: ICD-10-CM

## 2024-02-05 DIAGNOSIS — Z00.00 ANNUAL PHYSICAL EXAM: ICD-10-CM

## 2024-02-05 LAB
ALBUMIN SERPL BCP-MCNC: 3.9 G/DL (ref 3.5–5.2)
ALP SERPL-CCNC: 81 U/L (ref 55–135)
ALT SERPL W/O P-5'-P-CCNC: 17 U/L (ref 10–44)
ANION GAP SERPL CALC-SCNC: 11 MMOL/L (ref 8–16)
AST SERPL-CCNC: 22 U/L (ref 10–40)
BASOPHILS # BLD AUTO: 0.05 K/UL (ref 0–0.2)
BASOPHILS NFR BLD: 0.8 % (ref 0–1.9)
BILIRUB SERPL-MCNC: 0.5 MG/DL (ref 0.1–1)
BUN SERPL-MCNC: 12 MG/DL (ref 8–23)
CALCIUM SERPL-MCNC: 9.7 MG/DL (ref 8.7–10.5)
CHLORIDE SERPL-SCNC: 106 MMOL/L (ref 95–110)
CHOLEST SERPL-MCNC: 199 MG/DL (ref 120–199)
CHOLEST/HDLC SERPL: 3 {RATIO} (ref 2–5)
CO2 SERPL-SCNC: 25 MMOL/L (ref 23–29)
CREAT SERPL-MCNC: 0.8 MG/DL (ref 0.5–1.4)
DIFFERENTIAL METHOD BLD: ABNORMAL
EOSINOPHIL # BLD AUTO: 0.2 K/UL (ref 0–0.5)
EOSINOPHIL NFR BLD: 2.6 % (ref 0–8)
ERYTHROCYTE [DISTWIDTH] IN BLOOD BY AUTOMATED COUNT: 12.8 % (ref 11.5–14.5)
EST. GFR  (NO RACE VARIABLE): >60 ML/MIN/1.73 M^2
GLUCOSE SERPL-MCNC: 95 MG/DL (ref 70–110)
HCT VFR BLD AUTO: 45.8 % (ref 37–48.5)
HDLC SERPL-MCNC: 67 MG/DL (ref 40–75)
HDLC SERPL: 33.7 % (ref 20–50)
HGB BLD-MCNC: 15.3 G/DL (ref 12–16)
IMM GRANULOCYTES # BLD AUTO: 0.01 K/UL (ref 0–0.04)
IMM GRANULOCYTES NFR BLD AUTO: 0.2 % (ref 0–0.5)
LDLC SERPL CALC-MCNC: 120 MG/DL (ref 63–159)
LYMPHOCYTES # BLD AUTO: 2.8 K/UL (ref 1–4.8)
LYMPHOCYTES NFR BLD: 42.3 % (ref 18–48)
MCH RBC QN AUTO: 31.3 PG (ref 27–31)
MCHC RBC AUTO-ENTMCNC: 33.4 G/DL (ref 32–36)
MCV RBC AUTO: 94 FL (ref 82–98)
MONOCYTES # BLD AUTO: 0.6 K/UL (ref 0.3–1)
MONOCYTES NFR BLD: 8.6 % (ref 4–15)
NEUTROPHILS # BLD AUTO: 3 K/UL (ref 1.8–7.7)
NEUTROPHILS NFR BLD: 45.5 % (ref 38–73)
NONHDLC SERPL-MCNC: 132 MG/DL
NRBC BLD-RTO: 0 /100 WBC
PLATELET # BLD AUTO: 225 K/UL (ref 150–450)
PMV BLD AUTO: 11.2 FL (ref 9.2–12.9)
POTASSIUM SERPL-SCNC: 4.1 MMOL/L (ref 3.5–5.1)
PROT SERPL-MCNC: 6.7 G/DL (ref 6–8.4)
RBC # BLD AUTO: 4.89 M/UL (ref 4–5.4)
SODIUM SERPL-SCNC: 142 MMOL/L (ref 136–145)
TRIGL SERPL-MCNC: 60 MG/DL (ref 30–150)
TSH SERPL DL<=0.005 MIU/L-ACNC: 1.72 UIU/ML (ref 0.4–4)
WBC # BLD AUTO: 6.64 K/UL (ref 3.9–12.7)

## 2024-02-05 PROCEDURE — 84443 ASSAY THYROID STIM HORMONE: CPT | Performed by: INTERNAL MEDICINE

## 2024-02-05 PROCEDURE — 80053 COMPREHEN METABOLIC PANEL: CPT | Performed by: INTERNAL MEDICINE

## 2024-02-05 PROCEDURE — 80061 LIPID PANEL: CPT | Performed by: INTERNAL MEDICINE

## 2024-02-05 PROCEDURE — 36415 COLL VENOUS BLD VENIPUNCTURE: CPT | Mod: PO | Performed by: INTERNAL MEDICINE

## 2024-02-05 PROCEDURE — 85025 COMPLETE CBC W/AUTO DIFF WBC: CPT | Performed by: INTERNAL MEDICINE

## 2024-03-22 ENCOUNTER — HOSPITAL ENCOUNTER (OUTPATIENT)
Dept: RADIOLOGY | Facility: HOSPITAL | Age: 79
Discharge: HOME | End: 2024-03-22
Attending: INTERNAL MEDICINE
Payer: MEDICARE

## 2024-03-22 VITALS — WEIGHT: 118 LBS | HEIGHT: 64 IN | BODY MASS INDEX: 20.14 KG/M2

## 2024-03-22 DIAGNOSIS — Z12.31 ENCOUNTER FOR SCREENING MAMMOGRAM FOR BREAST CANCER: ICD-10-CM

## 2024-03-22 PROCEDURE — 77063 BREAST TOMOSYNTHESIS BI: CPT | Mod: 26,,, | Performed by: RADIOLOGY

## 2024-03-22 PROCEDURE — 77067 SCR MAMMO BI INCL CAD: CPT | Mod: 26,,, | Performed by: RADIOLOGY

## 2024-03-22 PROCEDURE — 77067 SCR MAMMO BI INCL CAD: CPT | Mod: TC,PO

## 2024-07-10 ENCOUNTER — OFFICE VISIT (OUTPATIENT)
Dept: URGENT CARE | Facility: CLINIC | Age: 79
End: 2024-07-10
Payer: MEDICARE

## 2024-07-10 VITALS
BODY MASS INDEX: 19.96 KG/M2 | RESPIRATION RATE: 20 BRPM | TEMPERATURE: 98 F | DIASTOLIC BLOOD PRESSURE: 68 MMHG | HEIGHT: 64 IN | HEART RATE: 74 BPM | WEIGHT: 116.88 LBS | SYSTOLIC BLOOD PRESSURE: 129 MMHG | OXYGEN SATURATION: 97 %

## 2024-07-10 DIAGNOSIS — R35.0 URINARY FREQUENCY: ICD-10-CM

## 2024-07-10 DIAGNOSIS — N30.00 ACUTE CYSTITIS WITHOUT HEMATURIA: Primary | ICD-10-CM

## 2024-07-10 LAB
BILIRUBIN, UA POC OHS: NEGATIVE
BLOOD, UA POC OHS: ABNORMAL
CLARITY, UA POC OHS: CLEAR
COLOR, UA POC OHS: YELLOW
GLUCOSE, UA POC OHS: NEGATIVE
KETONES, UA POC OHS: NEGATIVE
LEUKOCYTES, UA POC OHS: ABNORMAL
NITRITE, UA POC OHS: NEGATIVE
PH, UA POC OHS: 7
PROTEIN, UA POC OHS: NEGATIVE
SPECIFIC GRAVITY, UA POC OHS: 1.01
UROBILINOGEN, UA POC OHS: 0.2

## 2024-07-10 PROCEDURE — 99213 OFFICE O/P EST LOW 20 MIN: CPT | Mod: S$GLB,,, | Performed by: PHYSICIAN ASSISTANT

## 2024-07-10 PROCEDURE — 87086 URINE CULTURE/COLONY COUNT: CPT | Performed by: PHYSICIAN ASSISTANT

## 2024-07-10 PROCEDURE — 81003 URINALYSIS AUTO W/O SCOPE: CPT | Mod: QW,S$GLB,, | Performed by: PHYSICIAN ASSISTANT

## 2024-07-10 PROCEDURE — 87088 URINE BACTERIA CULTURE: CPT | Performed by: PHYSICIAN ASSISTANT

## 2024-07-10 PROCEDURE — 87186 SC STD MICRODIL/AGAR DIL: CPT | Performed by: PHYSICIAN ASSISTANT

## 2024-07-10 RX ORDER — NITROFURANTOIN 25; 75 MG/1; MG/1
100 CAPSULE ORAL 2 TIMES DAILY
Qty: 10 CAPSULE | Refills: 0 | Status: SHIPPED | OUTPATIENT
Start: 2024-07-10 | End: 2024-07-15

## 2024-07-10 NOTE — PROGRESS NOTES
"Subjective:      Patient ID: Trista Lozano is a 78 y.o. female.    Vitals:  height is 5' 4" (1.626 m) and weight is 53 kg (116 lb 13.5 oz). Her oral temperature is 97.7 °F (36.5 °C). Her blood pressure is 129/68 and her pulse is 74. Her respiration is 20 and oxygen saturation is 97%.     Chief Complaint: Dysuria    Trista Lozano is a 78 y.o. female who complains of urinary frequency and discomfort. Sx started 1 week ago. Tx include nothing at home. She reports urinary urgency in the past but not present today.  She is sexually active. Has a significant other that lives in [place].  She reports recurrent UTIs.     Urinary Frequency   This is a new problem. The current episode started 1 to 4 weeks ago. The problem occurs every urination. The problem has been unchanged. The pain is at a severity of 0/10. The patient is experiencing no pain. There has been no fever. She is Sexually active. There is No history of pyelonephritis. Associated symptoms include frequency. Pertinent negatives include no behavior changes, chills, discharge, flank pain, hematuria, hesitancy, nausea, possible pregnancy, sweats, urgency, vomiting, weight loss, bubble bath use, constipation, rash or withholding. She has tried nothing for the symptoms. The treatment provided no relief. Her past medical history is significant for recurrent UTIs. There is no history of diabetes insipidus, diabetes mellitus, hypertension, kidney stones, STD, urinary stasis or a urological procedure.     Constitution: Negative for chills, fatigue and fever.   HENT:  Negative for ear pain, ear discharge, foreign body in ear, tinnitus, congestion, postnasal drip, sinus pain, sinus pressure, sore throat, trouble swallowing and voice change.    Neck: Negative for neck pain and neck stiffness.   Cardiovascular:  Negative for chest pain, leg swelling, palpitations and sob on exertion.   Respiratory:  Negative for cough, sputum production, shortness of breath, wheezing and " asthma.    Gastrointestinal:  Negative for abdominal pain, nausea, vomiting and constipation.   Genitourinary:  Positive for frequency. Negative for dysuria, urgency, flank pain and hematuria.   Musculoskeletal:  Negative for back pain, muscle cramps and muscle ache.   Skin:  Negative for rash.   Allergic/Immunologic: Negative for environmental allergies, seasonal allergies, food allergies and asthma.   Psychiatric/Behavioral:  Negative for nervous/anxious. The patient is not nervous/anxious.       Objective:     Physical Exam   Constitutional: She is oriented to person, place, and time. No distress.      Comments:Patient is awake and alert, sitting up in exam chair, speaking and answering in complete sentences     normal  HENT:   Head: Normocephalic and atraumatic.   Ears:   Right Ear: Tympanic membrane, external ear and ear canal normal.   Left Ear: Tympanic membrane, external ear and ear canal normal.   Nose: No rhinorrhea or congestion.   Mouth/Throat: Mucous membranes are moist. No oropharyngeal exudate or posterior oropharyngeal erythema. Oropharynx is clear.   Eyes: Conjunctivae are normal. Pupils are equal, round, and reactive to light. Extraocular movement intact   Neck: Neck supple.   Cardiovascular: Normal rate, regular rhythm, normal heart sounds and normal pulses.   Pulmonary/Chest: Effort normal and breath sounds normal. No respiratory distress. She has no wheezes. She has no rhonchi. She has no rales.   Abdominal: Normal appearance. She exhibits no distension and no mass. There is no abdominal tenderness. There is no rebound, no guarding, no left CVA tenderness and no right CVA tenderness. No hernia.   Musculoskeletal: Normal range of motion.         General: Normal range of motion.      Cervical back: She exhibits no tenderness.   Lymphadenopathy:     She has no cervical adenopathy.   Neurological: She is alert and oriented to person, place, and time.   Skin: Skin is warm. Capillary refill takes less  than 2 seconds.   Psychiatric: Her behavior is normal. Mood, judgment and thought content normal.   Nursing note and vitals reviewed.      Assessment:     1. Acute cystitis without hematuria    2. Urinary frequency      Patient presents with clinical exam findings and history consistent with above.      On exam, patient is nontoxic appearing and vitals are stable.      Diagnostic testing results were reviewed and discussed with patient/guardian.   Tests ordered in clinic:  Results for orders placed or performed in visit on 07/10/24   POCT Urinalysis(Instrument)   Result Value Ref Range    Color, POC UA Yellow Yellow, Straw, Colorless    Clarity, POC UA Clear Clear    Glucose, POC UA Negative Negative    Bilirubin, POC UA Negative Negative    Ketones, POC UA Negative Negative    Spec Grav POC UA 1.015 1.005 - 1.030    Blood, POC UA Trace-intact (A) Negative    pH, POC UA 7.0 5.0 - 8.0    Protein, POC UA Negative Negative    Urobilinogen, POC UA 0.2 <=1.0    Nitrite, POC UA Negative Negative    WBC, POC UA Small (A) Negative       Previous progress notes/admissions/labs and medications were reviewed.  Reviewed GFR> 60 from CMP from 2/5/24.   Plan:       Acute cystitis without hematuria  -     CULTURE, URINE  -     nitrofurantoin, macrocrystal-monohydrate, (MACROBID) 100 MG capsule; Take 1 capsule (100 mg total) by mouth 2 (two) times daily. for 5 days  Dispense: 10 capsule; Refill: 0  -     Ambulatory referral/consult to Urogynecology    Urinary frequency  -     POCT Urinalysis(Instrument)  -     CULTURE, URINE  -     nitrofurantoin, macrocrystal-monohydrate, (MACROBID) 100 MG capsule; Take 1 capsule (100 mg total) by mouth 2 (two) times daily. for 5 days  Dispense: 10 capsule; Refill: 0  -     Ambulatory referral/consult to Urogynecology                    1) See orders for this visit as documented in the electronic medical record.  2) Symptomatic therapy suggested: use acetaminophen/ibuprofen every 6-8 hours prn  "pain or fever, push fluids.   3) Call or return to clinic prn if these symptoms worsen or fail to improve as anticipated.    Discussed results/diagnosis/plan with patient in clinic.  We had shared decision making for patient's treatment. Patient verbalized understanding and in agreement with current treatment plan.     Patient was instructed to return for re-evaluation with urgent care or PCP for continued outpatient workup and management if symptoms do not improve/worsening symptoms. Strict ED versus clinic precautions given in depth.    Discharge and follow-up instructions given verbally/printed with the patient who expressed understanding. The instructions and results are also available on [facility]              [facility] "[facility]am[facility]" KELLY Banerjee          Patient Instructions   Recommendations to help prevent UTIs  Cranberry products: 8-ounce (approximately 240 mL) glass of cranberry juice once or twice daily or cranberry concentrate tablets 500 mg to 1000 mg total daily dose  Postcoital voiding (urinate after sex)  Hygiene: Wiping from front to back to avoid perineal contamination with fecal neha is routinely recommended as a prevention measure.   Topical estrogen for postmenopausal women   Drink plenty of fluids.    ---------------------------------    Urine culture was ordered if you have a history of recurrent UTIs, pediatrics/elderly population. You will get a phone call within 3-5 days regarding urine culture results.  If you were prescribed antibiotics, please take them to completion.  If you were prescribed a narcotic medication, do not drive or operate heavy equipment or machinery while taking these medications.  Please drink plenty of fluids.  Please get plenty of rest.  If you were prescribed Pyridium (phenazopyridine), please be aware that if you wear contact lens that this medication may stain your contacts.  While taking this medication it is recommended that you do not wear your contacts until 24 hours " after your last dose. If it not covered, you can  purchase Azo (phenazopyridine 99 mg) over the counter at drug stores.  If you  smoke, please stop smoking.  If not allergic, take Tylenol (Acetaminophen) 650 mg to  1 g every 6 hours as needed for fever and/or Motrin (Ibuprofen) 600 to 800 mg every 6 hours as needed for fever as directed for control of pain and/or fever          Please remember that you have received care at an urgent care today. Urgent cares are not emergency rooms and are not equipped to handle life threatening emergencies and cannot rule in or out certain medical conditions and you may be released before all of your medical problems are known or treated. Please arrange follow up with your primary care physician or speciality clinic  within 2-5 days if your signs and symptoms have not resolved or worsen. Patient can call our Referral Hotline at [phone] to make an appointment.    Please return here or go to the Emergency Department for any concerns or worsening of condition.Patient was educated on signs/symptoms that would warrant emergent medical attention.   Signs of infection. These include a fever of 100.4°F (38°C) or higher, chills, back pain, nausea, throwing up, or bloody urine.  Signs come back after treatment ends  You notice more blood in your urine.  Your signs get worse or do not improve within 24 hours of starting treatment.  You are not able to urinate for more than 8 hours.  Your signs come back after treatment has stopped.

## 2024-07-10 NOTE — LETTER
"  July 10, 2024      [facility] [place] [address]  [place] [address]  Phone: [phone]  Fax: [phone]       Patient: Trista Lozano   YOB: 1945  Date of Visit: 07/10/2024    To Whom It May Concern:    Yordan Lozano  was at [facility] on 07/10/2024. The patient may return to work/school on 7/11/24 with no restrictions. If you have any questions or concerns, or if I can be of further assistance, please do not hesitate to contact me.    Sincerely,        Domitilamehreen Banerjee PA-C (Jackie)       "

## 2024-07-10 NOTE — PATIENT INSTRUCTIONS
Recommendations to help prevent UTIs  Cranberry products: 8-ounce (approximately 240 mL) glass of cranberry juice once or twice daily or cranberry concentrate tablets 500 mg to 1000 mg total daily dose  Postcoital voiding (urinate after sex)  Hygiene: Wiping from front to back to avoid perineal contamination with fecal neha is routinely recommended as a prevention measure.   Topical estrogen for postmenopausal women   Drink plenty of fluids.    ---------------------------------    Urine culture was ordered if you have a history of recurrent UTIs, pediatrics/elderly population. You will get a phone call within 3-5 days regarding urine culture results.  If you were prescribed antibiotics, please take them to completion.  If you were prescribed a narcotic medication, do not drive or operate heavy equipment or machinery while taking these medications.  Please drink plenty of fluids.  Please get plenty of rest.  If you were prescribed Pyridium (phenazopyridine), please be aware that if you wear contact lens that this medication may stain your contacts.  While taking this medication it is recommended that you do not wear your contacts until 24 hours after your last dose. If it not covered, you can  purchase Azo (phenazopyridine 99 mg) over the counter at drug stores.  If you  smoke, please stop smoking.  If not allergic, take Tylenol (Acetaminophen) 650 mg to  1 g every 6 hours as needed for fever and/or Motrin (Ibuprofen) 600 to 800 mg every 6 hours as needed for fever as directed for control of pain and/or fever          Please remember that you have received care at an urgent care today. Urgent cares are not emergency rooms and are not equipped to handle life threatening emergencies and cannot rule in or out certain medical conditions and you may be released before all of your medical problems are known or treated. Please arrange follow up with your primary care physician or speciality clinic  within 2-5 days if your  signs and symptoms have not resolved or worsen. Patient can call our Referral Hotline at [phone] to make an appointment.    Please return here or go to the Emergency Department for any concerns or worsening of condition.Patient was educated on signs/symptoms that would warrant emergent medical attention.   Signs of infection. These include a fever of 100.4°F (38°C) or higher, chills, back pain, nausea, throwing up, or bloody urine.  Signs come back after treatment ends  You notice more blood in your urine.  Your signs get worse or do not improve within 24 hours of starting treatment.  You are not able to urinate for more than 8 hours.  Your signs come back after treatment has stopped.

## 2024-07-11 LAB — BACTERIA UR CULT: ABNORMAL

## 2024-07-14 ENCOUNTER — TELEPHONE (OUTPATIENT)
Dept: URGENT CARE | Facility: CLINIC | Age: 79
End: 2024-07-14
Payer: MEDICARE

## 2024-07-14 DIAGNOSIS — N30.00 ACUTE CYSTITIS WITHOUT HEMATURIA: Primary | ICD-10-CM

## 2024-07-14 RX ORDER — SULFAMETHOXAZOLE AND TRIMETHOPRIM 800; 160 MG/1; MG/1
1 TABLET ORAL 2 TIMES DAILY
Qty: 6 TABLET | Refills: 0 | Status: SHIPPED | OUTPATIENT
Start: 2024-07-14 | End: 2024-07-17

## 2024-07-14 NOTE — TELEPHONE ENCOUNTER
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       Patient states her urinary frequency has resolved. She is on her last day of Macrobid. Discussed with pt that she should take bactrim DS 1 tab po TID since the bacteria was resistant to macrobid. Pt verbalized understanding.     Results for orders placed or performed in visit on 07/10/24   CULTURE, URINE    Specimen: Urine, Clean Catch   Result Value Ref Range    Urine Culture, Routine ESCHERICHIA HERMANNII  10,000 - 49,999 cfu/ml   (A)        Susceptibility    Escherichia hermannii - CULTURE, URINE     Amp/Sulbactam <=8/4 Sensitive mcg/mL     Ceftriaxone <=1 Sensitive mcg/mL     Cefazolin <=2 Sensitive mcg/mL     Ciprofloxacin <=0.25 Sensitive mcg/mL     Cefepime <=2 Sensitive mcg/mL     Ertapenem <=0.5 Sensitive mcg/mL     Nitrofurantoin >64 Resistant mcg/mL     Gentamicin <=2 Sensitive mcg/mL     Levofloxacin <=0.5 Sensitive mcg/mL     Meropenem <=1 Sensitive mcg/mL     Piperacillin/Tazo <=8 Sensitive mcg/mL     Trimeth/Sulfa <=2/38 Sensitive mcg/mL     Tobramycin <=2 Sensitive mcg/mL   POCT Urinalysis(Instrument)   Result Value Ref Range    Color, POC UA Yellow Yellow, Straw, Colorless    Clarity, POC UA Clear Clear    Glucose, POC UA Negative Negative    Bilirubin, POC UA Negative Negative    Ketones, POC UA Negative Negative    Spec Grav POC UA 1.015 1.005 - 1.030    Blood, POC UA Trace-intact (A) Negative    pH, POC UA 7.0 5.0 - 8.0    Protein, POC UA Negative Negative    Urobilinogen, POC UA 0.2 <=1.0    Nitrite, POC UA Negative Negative    WBC, POC UA Small (A) Negative

## 2024-10-01 ENCOUNTER — PATIENT MESSAGE (OUTPATIENT)
Dept: INTERNAL MEDICINE | Facility: CLINIC | Age: 79
End: 2024-10-01
Payer: MEDICARE

## 2025-02-14 ENCOUNTER — OFFICE VISIT (OUTPATIENT)
Dept: INTERNAL MEDICINE | Facility: CLINIC | Age: 80
End: 2025-02-14
Payer: MEDICARE

## 2025-02-14 VITALS
RESPIRATION RATE: 18 BRPM | OXYGEN SATURATION: 98 % | DIASTOLIC BLOOD PRESSURE: 88 MMHG | TEMPERATURE: 98 F | WEIGHT: 122.38 LBS | BODY MASS INDEX: 20.89 KG/M2 | HEART RATE: 81 BPM | HEIGHT: 64 IN | SYSTOLIC BLOOD PRESSURE: 138 MMHG

## 2025-02-14 DIAGNOSIS — M81.0 OSTEOPOROSIS, UNSPECIFIED OSTEOPOROSIS TYPE, UNSPECIFIED PATHOLOGICAL FRACTURE PRESENCE: ICD-10-CM

## 2025-02-14 DIAGNOSIS — R73.9 ELEVATED BLOOD SUGAR: ICD-10-CM

## 2025-02-14 DIAGNOSIS — E78.5 HYPERLIPIDEMIA, UNSPECIFIED HYPERLIPIDEMIA TYPE: ICD-10-CM

## 2025-02-14 DIAGNOSIS — E78.5 DYSLIPIDEMIA: ICD-10-CM

## 2025-02-14 DIAGNOSIS — R53.83 FATIGUE, UNSPECIFIED TYPE: Primary | ICD-10-CM

## 2025-02-14 DIAGNOSIS — Z00.00 ANNUAL PHYSICAL EXAM: Primary | ICD-10-CM

## 2025-02-14 DIAGNOSIS — Z12.31 ENCOUNTER FOR SCREENING MAMMOGRAM FOR BREAST CANCER: ICD-10-CM

## 2025-02-14 PROBLEM — M85.89 OSTEOPENIA OF MULTIPLE SITES: Status: RESOLVED | Noted: 2017-04-06 | Resolved: 2025-02-14

## 2025-02-14 PROCEDURE — 99999 PR PBB SHADOW E&M-EST. PATIENT-LVL IV: CPT | Mod: PBBFAC,,, | Performed by: INTERNAL MEDICINE

## 2025-02-14 PROCEDURE — 99214 OFFICE O/P EST MOD 30 MIN: CPT | Mod: PBBFAC,PO | Performed by: INTERNAL MEDICINE

## 2025-02-14 NOTE — PROGRESS NOTES
Subjective     Patient ID: rTista Lozano is a 79 y.o. female.    Chief Complaint: Annual Exam    HPI  79 y.o. Female here for annual exam.      Vaccines: Influenza (2024); Tetanus (2023); PNA (current); Shingrix (done)  Eye exam: current  Mammogram: 3/24  Gyn exam: declined  Colonoscopy: 4/16  DEXA: 3/23     Exercise: no  Diet: regular      Past Medical History:  No date: Hyperlipidemia  No date: Menarche      Comment:  Age of onset 12  No date: Osteopenia  Past Surgical History:  4/20/2016: COLONOSCOPY; N/A      Comment:  Procedure: COLONOSCOPY;  Surgeon: Ellis Fry MD;  Location: [address]);  Service: Endoscopy;                Laterality: N/A;  Pt request Prepopik/covered by                insurance/Moviprep is not  No date: NO PAST SURGERIES  Social History    Socioeconomic History      Marital status:     Tobacco Use      Smoking status: Never      Smokeless tobacco: Never    Substance and Sexual Activity      Alcohol use: Yes        Comment: Socially/ few drinks (2-3) every week      Drug use: No      Sexual activity: Yes        Birth control/protection: Post-menopausal     Social Determinants of Health  Financial Resource Strain: Low Risk  (4/7/2022)      Overall Financial Resource Strain (CARDIA)          Difficulty of Paying Living Expenses: Not hard at all  Food Insecurity: No Food Insecurity (4/7/2022)      Hunger Vital Sign          Worried About Running Out of Food in the Last Year: Never true          Ran Out of Food in the Last Year: Never true  Transportation Needs: No Transportation Needs (4/7/2022)      PRAPARE - Transportation          Lack of Transportation (Medical): No          Lack of Transportation (Non-Medical): No  Stress: No Stress Concern Present (4/7/2022)      [employer] - Occupational Stress Questionnaire          Feeling of Stress : Only a little  Housing Stability: Unknown (4/7/2022)      Housing Stability Vital  Sign          Unable to Pay for Housing in the Last Year: No          Unstable Housing in the Last Year: No  Review of patient's allergies indicates:  No Known Allergies  Review of Systems   Constitutional:  Negative for activity change, appetite change, chills, diaphoresis, fatigue, fever and unexpected weight change.   HENT:  Negative for nasal congestion, mouth sores, postnasal drip, rhinorrhea, sinus pressure/congestion, sneezing, sore throat, trouble swallowing and voice change.    Eyes:  Negative for pain, discharge and visual disturbance.   Respiratory:  Negative for cough, shortness of breath and wheezing.    Cardiovascular:  Negative for chest pain, palpitations and leg swelling.   Gastrointestinal:  Negative for abdominal pain, blood in stool, constipation, diarrhea, nausea and vomiting.   Endocrine: Negative for cold intolerance and heat intolerance.   Genitourinary:  Negative for difficulty urinating, dysuria, frequency, hematuria and urgency.   Musculoskeletal:  Negative for arthralgias and myalgias.   Integumentary:  Negative for rash and wound.   Allergic/Immunologic: Negative for environmental allergies and food allergies.   Neurological:  Negative for dizziness, tremors, seizures, syncope, weakness, light-headedness and headaches.   Hematological:  Negative for adenopathy. Does not bruise/bleed easily.   Psychiatric/Behavioral:  Negative for confusion and sleep disturbance. The patient is not nervous/anxious.           Objective     Physical Exam  Vitals and nursing note reviewed.   Constitutional:       General: She is not in acute distress.     Appearance: Normal appearance. She is well-developed. She is not diaphoretic.   HENT:      Head: Normocephalic and atraumatic.      Right Ear: External ear normal.      Left Ear: External ear normal.      Nose: Nose normal.      Mouth/Throat:      Pharynx: No oropharyngeal exudate.   Eyes:      General: No scleral icterus.        Right eye: No discharge.          Left eye: No discharge.      Conjunctiva/sclera: Conjunctivae normal.      Pupils: Pupils are equal, round, and reactive to light.   Neck:      Thyroid: No thyromegaly.      Vascular: No JVD.   Cardiovascular:      Rate and Rhythm: Normal rate and regular rhythm.      Pulses: Normal pulses.      Heart sounds: Normal heart sounds. No murmur heard.  Pulmonary:      Effort: Pulmonary effort is normal. No respiratory distress.      Breath sounds: Normal breath sounds. No wheezing, rhonchi or rales.   Chest:      Chest wall: No tenderness.   Abdominal:      General: Bowel sounds are normal. There is no distension.      Palpations: Abdomen is soft.      Tenderness: There is no abdominal tenderness. There is no guarding or rebound.   Musculoskeletal:      Cervical back: Neck supple.      Right lower leg: No edema.      Left lower leg: No edema.   Lymphadenopathy:      Cervical: No cervical adenopathy.   Skin:     General: Skin is warm and dry.      Coloration: Skin is not pale.      Findings: No rash.   Neurological:      General: No focal deficit present.      Mental Status: She is alert and oriented to person, place, and time.      Gait: Gait normal.   Psychiatric:         Behavior: Behavior normal.         Thought Content: Thought content normal.         Judgment: Judgment normal.            Assessment and Plan     1. Annual physical exam    2. Hyperlipidemia, unspecified hyperlipidemia type    3. Osteoporosis, unspecified osteoporosis type, unspecified pathological fracture presence  -     DXA Bone Density Axial Skeleton 1 or more sites; Future; Expected date: 02/14/2025    4. Encounter for screening mammogram for breast cancer  -     Mammo Digital Screening Bilat w/ [facility] (XPD); Future; Expected date: 03/23/2025      Blood work ordered     Osteopenia- pt stopped the Fosamax around 2 yrs   -repeat DEXA     Dyslipidemia- diet controlled     F/u in 1 yr        Over 1/2 of 40 minute visit spent reviewing pt's medical  records, education/discussion of pt's medical conditions and medical management

## 2025-02-17 ENCOUNTER — TELEPHONE (OUTPATIENT)
Dept: INTERNAL MEDICINE | Facility: CLINIC | Age: 80
End: 2025-02-17
Payer: MEDICARE

## 2025-02-17 DIAGNOSIS — E78.5 DYSLIPIDEMIA: ICD-10-CM

## 2025-02-17 DIAGNOSIS — R53.83 FATIGUE, UNSPECIFIED TYPE: ICD-10-CM

## 2025-02-17 DIAGNOSIS — R73.9 ELEVATED BLOOD SUGAR: ICD-10-CM

## 2025-02-17 DIAGNOSIS — Z00.00 ANNUAL PHYSICAL EXAM: Primary | ICD-10-CM

## 2025-02-18 ENCOUNTER — LAB VISIT (OUTPATIENT)
Dept: LAB | Facility: HOSPITAL | Age: 80
End: 2025-02-18
Attending: INTERNAL MEDICINE
Payer: MEDICARE

## 2025-02-18 DIAGNOSIS — E78.5 DYSLIPIDEMIA: ICD-10-CM

## 2025-02-18 DIAGNOSIS — R73.9 ELEVATED BLOOD SUGAR: ICD-10-CM

## 2025-02-18 DIAGNOSIS — R53.83 FATIGUE, UNSPECIFIED TYPE: ICD-10-CM

## 2025-02-18 LAB
ALBUMIN SERPL BCP-MCNC: 3.9 G/DL (ref 3.5–5.2)
ALP SERPL-CCNC: 76 U/L (ref 40–150)
ALT SERPL W/O P-5'-P-CCNC: 19 U/L (ref 10–44)
ANION GAP SERPL CALC-SCNC: 10 MMOL/L (ref 8–16)
AST SERPL-CCNC: 26 U/L (ref 10–40)
BASOPHILS # BLD AUTO: 0.08 K/UL (ref 0–0.2)
BASOPHILS NFR BLD: 1.2 % (ref 0–1.9)
BILIRUB SERPL-MCNC: 0.6 MG/DL (ref 0.1–1)
BUN SERPL-MCNC: 14 MG/DL (ref 8–23)
CALCIUM SERPL-MCNC: 9.6 MG/DL (ref 8.7–10.5)
CHLORIDE SERPL-SCNC: 107 MMOL/L (ref 95–110)
CHOLEST SERPL-MCNC: 208 MG/DL (ref 120–199)
CHOLEST/HDLC SERPL: 3 {RATIO} (ref 2–5)
CO2 SERPL-SCNC: 25 MMOL/L (ref 23–29)
CREAT SERPL-MCNC: 0.8 MG/DL (ref 0.5–1.4)
DIFFERENTIAL METHOD BLD: ABNORMAL
EOSINOPHIL # BLD AUTO: 0.2 K/UL (ref 0–0.5)
EOSINOPHIL NFR BLD: 3.2 % (ref 0–8)
ERYTHROCYTE [DISTWIDTH] IN BLOOD BY AUTOMATED COUNT: 13.1 % (ref 11.5–14.5)
EST. GFR  (NO RACE VARIABLE): >60 ML/MIN/1.73 M^2
ESTIMATED AVG GLUCOSE: 100 MG/DL (ref 68–131)
GLUCOSE SERPL-MCNC: 99 MG/DL (ref 70–110)
HBA1C MFR BLD: 5.1 % (ref 4–5.6)
HCT VFR BLD AUTO: 46 % (ref 37–48.5)
HDLC SERPL-MCNC: 70 MG/DL (ref 40–75)
HDLC SERPL: 33.7 % (ref 20–50)
HGB BLD-MCNC: 15.2 G/DL (ref 12–16)
IMM GRANULOCYTES # BLD AUTO: 0.01 K/UL (ref 0–0.04)
IMM GRANULOCYTES NFR BLD AUTO: 0.2 % (ref 0–0.5)
LDLC SERPL CALC-MCNC: 128 MG/DL (ref 63–159)
LYMPHOCYTES # BLD AUTO: 2.6 K/UL (ref 1–4.8)
LYMPHOCYTES NFR BLD: 38.9 % (ref 18–48)
MCH RBC QN AUTO: 31.7 PG (ref 27–31)
MCHC RBC AUTO-ENTMCNC: 33 G/DL (ref 32–36)
MCV RBC AUTO: 96 FL (ref 82–98)
MONOCYTES # BLD AUTO: 0.5 K/UL (ref 0.3–1)
MONOCYTES NFR BLD: 7.7 % (ref 4–15)
NEUTROPHILS # BLD AUTO: 3.2 K/UL (ref 1.8–7.7)
NEUTROPHILS NFR BLD: 48.8 % (ref 38–73)
NONHDLC SERPL-MCNC: 138 MG/DL
NRBC BLD-RTO: 0 /100 WBC
PLATELET # BLD AUTO: 202 K/UL (ref 150–450)
PMV BLD AUTO: 12 FL (ref 9.2–12.9)
POTASSIUM SERPL-SCNC: 4.1 MMOL/L (ref 3.5–5.1)
PROT SERPL-MCNC: 6.8 G/DL (ref 6–8.4)
RBC # BLD AUTO: 4.8 M/UL (ref 4–5.4)
SODIUM SERPL-SCNC: 142 MMOL/L (ref 136–145)
TRIGL SERPL-MCNC: 50 MG/DL (ref 30–150)
TSH SERPL DL<=0.005 MIU/L-ACNC: 1.12 UIU/ML (ref 0.4–4)
WBC # BLD AUTO: 6.6 K/UL (ref 3.9–12.7)

## 2025-02-18 PROCEDURE — 84443 ASSAY THYROID STIM HORMONE: CPT | Performed by: INTERNAL MEDICINE

## 2025-02-18 PROCEDURE — 80053 COMPREHEN METABOLIC PANEL: CPT | Performed by: INTERNAL MEDICINE

## 2025-02-18 PROCEDURE — 83036 HEMOGLOBIN GLYCOSYLATED A1C: CPT | Performed by: INTERNAL MEDICINE

## 2025-02-18 PROCEDURE — 36415 COLL VENOUS BLD VENIPUNCTURE: CPT | Performed by: INTERNAL MEDICINE

## 2025-02-18 PROCEDURE — 85025 COMPLETE CBC W/AUTO DIFF WBC: CPT | Performed by: INTERNAL MEDICINE

## 2025-02-18 PROCEDURE — 80061 LIPID PANEL: CPT | Performed by: INTERNAL MEDICINE

## 2025-02-19 ENCOUNTER — RESULTS FOLLOW-UP (OUTPATIENT)
Dept: INTERNAL MEDICINE | Facility: CLINIC | Age: 80
End: 2025-02-19

## 2025-02-20 ENCOUNTER — TELEPHONE (OUTPATIENT)
Dept: INTERNAL MEDICINE | Facility: CLINIC | Age: 80
End: 2025-02-20
Payer: MEDICARE

## 2025-02-20 NOTE — TELEPHONE ENCOUNTER
Pt says she doesnt need/want knee orthotics, unable to reach phone number to tell them to stop sending these

## 2025-02-20 NOTE — TELEPHONE ENCOUNTER
----- Message from Arlene sent at 2/20/2025 12:09 PM CST -----  Contact: Rigo [phone]  Rigo calling from [facility] in regards to prescription request form for  both knee orthotics.  This was faxed over 02/04/25.  Please have provider sign and fax back. Please call to let him know if it was received.

## 2025-02-22 DIAGNOSIS — Z00.00 ENCOUNTER FOR MEDICARE ANNUAL WELLNESS EXAM: ICD-10-CM

## 2025-03-25 ENCOUNTER — HOSPITAL ENCOUNTER (OUTPATIENT)
Dept: RADIOLOGY | Facility: HOSPITAL | Age: 80
Discharge: HOME | End: 2025-03-25
Attending: INTERNAL MEDICINE
Payer: MEDICARE

## 2025-03-25 VITALS — BODY MASS INDEX: 20.83 KG/M2 | HEIGHT: 64 IN | WEIGHT: 122 LBS

## 2025-03-25 DIAGNOSIS — M81.0 OSTEOPOROSIS, UNSPECIFIED OSTEOPOROSIS TYPE, UNSPECIFIED PATHOLOGICAL FRACTURE PRESENCE: ICD-10-CM

## 2025-03-25 DIAGNOSIS — Z12.31 ENCOUNTER FOR SCREENING MAMMOGRAM FOR BREAST CANCER: ICD-10-CM

## 2025-03-25 PROCEDURE — 77067 SCR MAMMO BI INCL CAD: CPT | Mod: TC

## 2025-03-25 PROCEDURE — 77080 DXA BONE DENSITY AXIAL: CPT | Mod: 26,,, | Performed by: INTERNAL MEDICINE

## 2025-03-25 PROCEDURE — 77063 BREAST TOMOSYNTHESIS BI: CPT | Mod: 26,,, | Performed by: RADIOLOGY

## 2025-03-25 PROCEDURE — 77080 DXA BONE DENSITY AXIAL: CPT | Mod: TC

## 2025-03-25 PROCEDURE — 77067 SCR MAMMO BI INCL CAD: CPT | Mod: 26,,, | Performed by: RADIOLOGY

## 2025-04-07 ENCOUNTER — RESULTS FOLLOW-UP (OUTPATIENT)
Dept: INTERNAL MEDICINE | Facility: CLINIC | Age: 80
End: 2025-04-07

## 2025-04-07 ENCOUNTER — TELEPHONE (OUTPATIENT)
Dept: INTERNAL MEDICINE | Facility: CLINIC | Age: 80
End: 2025-04-07
Payer: MEDICARE

## 2025-04-07 DIAGNOSIS — M81.0 OSTEOPOROSIS WITHOUT CURRENT PATHOLOGICAL FRACTURE, UNSPECIFIED OSTEOPOROSIS TYPE: Primary | ICD-10-CM

## 2025-04-07 NOTE — TELEPHONE ENCOUNTER
Called pt & let her know, she said she is about to leave for [place] until 4/19 so she cannot get scheduled w endocrinology yet, but would like to be scheduled once she gets back 4/20, will send message to referral coordinators then

## 2025-04-07 NOTE — TELEPHONE ENCOUNTER
----- Message from Moi Lew DO sent at 4/7/2025  3:15 PM CDT -----  Ref to Endo  ----- Message -----  From: Interface, Rad Results In  Sent: 3/25/2025   4:19 PM CDT  To: Moi Lew DO

## 2025-04-21 ENCOUNTER — TELEPHONE (OUTPATIENT)
Dept: INTERNAL MEDICINE | Facility: CLINIC | Age: 80
End: 2025-04-21

## 2025-04-21 NOTE — TELEPHONE ENCOUNTER
Pt called back and was wondering how this came to happen, she has not had any issues on bone density scans in past, no falls or broken bones & has been taking calcium & vit d, and other things that could lead to or be caused by osteoporosis?

## 2025-04-25 NOTE — TELEPHONE ENCOUNTER
Pt has had low bone mass in the past and previous DEXA done in 2023 showed Osteoporosis which was confirmed again with last scan done in March. This is very common in many women as they age. Discuss further with endocrinology, referral in.

## 2025-05-13 ENCOUNTER — TELEPHONE (OUTPATIENT)
Dept: INTERNAL MEDICINE | Facility: CLINIC | Age: 80
End: 2025-05-13
Payer: MEDICARE

## 2025-05-13 NOTE — TELEPHONE ENCOUNTER
Called pt and let her know, she voiced understanding & confirmed she takes calcium/vit d, said she will await call from referral coordinators

## 2025-05-13 NOTE — TELEPHONE ENCOUNTER
----- Message from Med Assistant Aly sent at 5/13/2025 11:32 AM CDT -----  Regarding: FW: Returning Call for Lance  Contact: Pt [phone]    ----- Message -----  From: James Dalton  Sent: 5/13/2025  11:29 AM CDT  To: Louann PEREZ Staff  Subject: Returning Call for Lance                          Type: Returning a callWho left a message? Alan did the practice call? 4/28 or 4/25Does patient know what this is regarding: ReferralWould the patient rather a call back or a response via [facility]? CallComments: Cell: [phone]